# Patient Record
Sex: MALE | Race: WHITE | NOT HISPANIC OR LATINO | Employment: FULL TIME | URBAN - METROPOLITAN AREA
[De-identification: names, ages, dates, MRNs, and addresses within clinical notes are randomized per-mention and may not be internally consistent; named-entity substitution may affect disease eponyms.]

---

## 2017-01-11 ENCOUNTER — ALLSCRIPTS OFFICE VISIT (OUTPATIENT)
Dept: OTHER | Facility: OTHER | Age: 67
End: 2017-01-11

## 2017-03-09 ENCOUNTER — ALLSCRIPTS OFFICE VISIT (OUTPATIENT)
Dept: OTHER | Facility: OTHER | Age: 67
End: 2017-03-09

## 2017-03-09 DIAGNOSIS — E04.1 NONTOXIC SINGLE THYROID NODULE: ICD-10-CM

## 2017-03-09 DIAGNOSIS — Z12.5 ENCOUNTER FOR SCREENING FOR MALIGNANT NEOPLASM OF PROSTATE: ICD-10-CM

## 2017-03-09 DIAGNOSIS — I10 ESSENTIAL (PRIMARY) HYPERTENSION: ICD-10-CM

## 2017-03-13 ENCOUNTER — LAB (OUTPATIENT)
Dept: LAB | Facility: CLINIC | Age: 67
End: 2017-03-13
Payer: COMMERCIAL

## 2017-03-13 DIAGNOSIS — E04.1 NONTOXIC SINGLE THYROID NODULE: ICD-10-CM

## 2017-03-13 DIAGNOSIS — Z12.5 ENCOUNTER FOR SCREENING FOR MALIGNANT NEOPLASM OF PROSTATE: ICD-10-CM

## 2017-03-13 DIAGNOSIS — I10 ESSENTIAL (PRIMARY) HYPERTENSION: ICD-10-CM

## 2017-03-13 LAB
ALBUMIN SERPL BCP-MCNC: 3.6 G/DL (ref 3.5–5)
ALP SERPL-CCNC: 64 U/L (ref 46–116)
ALT SERPL W P-5'-P-CCNC: 26 U/L (ref 12–78)
ANION GAP SERPL CALCULATED.3IONS-SCNC: 7 MMOL/L (ref 4–13)
AST SERPL W P-5'-P-CCNC: 10 U/L (ref 5–45)
BASOPHILS # BLD AUTO: 0.02 THOUSANDS/ΜL (ref 0–0.1)
BASOPHILS NFR BLD AUTO: 0 % (ref 0–1)
BILIRUB SERPL-MCNC: 0.88 MG/DL (ref 0.2–1)
BUN SERPL-MCNC: 17 MG/DL (ref 5–25)
CALCIUM SERPL-MCNC: 8.5 MG/DL (ref 8.3–10.1)
CHLORIDE SERPL-SCNC: 105 MMOL/L (ref 100–108)
CHOLEST SERPL-MCNC: 169 MG/DL (ref 50–200)
CO2 SERPL-SCNC: 28 MMOL/L (ref 21–32)
CREAT SERPL-MCNC: 0.92 MG/DL (ref 0.6–1.3)
EOSINOPHIL # BLD AUTO: 0.16 THOUSAND/ΜL (ref 0–0.61)
EOSINOPHIL NFR BLD AUTO: 3 % (ref 0–6)
ERYTHROCYTE [DISTWIDTH] IN BLOOD BY AUTOMATED COUNT: 13 % (ref 11.6–15.1)
GFR SERPL CREATININE-BSD FRML MDRD: >60 ML/MIN/1.73SQ M
GLUCOSE SERPL-MCNC: 96 MG/DL (ref 65–140)
HCT VFR BLD AUTO: 42.8 % (ref 36.5–49.3)
HDLC SERPL-MCNC: 83 MG/DL (ref 40–60)
HGB BLD-MCNC: 14.7 G/DL (ref 12–17)
LDLC SERPL CALC-MCNC: 79 MG/DL (ref 0–100)
LYMPHOCYTES # BLD AUTO: 1.43 THOUSANDS/ΜL (ref 0.6–4.47)
LYMPHOCYTES NFR BLD AUTO: 28 % (ref 14–44)
MCH RBC QN AUTO: 29.7 PG (ref 26.8–34.3)
MCHC RBC AUTO-ENTMCNC: 34.3 G/DL (ref 31.4–37.4)
MCV RBC AUTO: 87 FL (ref 82–98)
MONOCYTES # BLD AUTO: 0.51 THOUSAND/ΜL (ref 0.17–1.22)
MONOCYTES NFR BLD AUTO: 10 % (ref 4–12)
NEUTROPHILS # BLD AUTO: 2.96 THOUSANDS/ΜL (ref 1.85–7.62)
NEUTS SEG NFR BLD AUTO: 59 % (ref 43–75)
NRBC BLD AUTO-RTO: 0 /100 WBCS
PLATELET # BLD AUTO: 218 THOUSANDS/UL (ref 149–390)
PMV BLD AUTO: 11.4 FL (ref 8.9–12.7)
POTASSIUM SERPL-SCNC: 3.8 MMOL/L (ref 3.5–5.3)
PROT SERPL-MCNC: 6.7 G/DL (ref 6.4–8.2)
PSA SERPL-MCNC: 2.2 NG/ML (ref 0–4)
RBC # BLD AUTO: 4.95 MILLION/UL (ref 3.88–5.62)
SODIUM SERPL-SCNC: 140 MMOL/L (ref 136–145)
TRIGL SERPL-MCNC: 34 MG/DL
TSH SERPL DL<=0.05 MIU/L-ACNC: 2.65 UIU/ML (ref 0.36–3.74)
WBC # BLD AUTO: 5.09 THOUSAND/UL (ref 4.31–10.16)

## 2017-03-13 PROCEDURE — 80053 COMPREHEN METABOLIC PANEL: CPT

## 2017-03-13 PROCEDURE — 36415 COLL VENOUS BLD VENIPUNCTURE: CPT

## 2017-03-13 PROCEDURE — 80061 LIPID PANEL: CPT

## 2017-03-13 PROCEDURE — 85025 COMPLETE CBC W/AUTO DIFF WBC: CPT

## 2017-03-13 PROCEDURE — G0103 PSA SCREENING: HCPCS

## 2017-03-13 PROCEDURE — 84443 ASSAY THYROID STIM HORMONE: CPT

## 2017-03-14 ENCOUNTER — GENERIC CONVERSION - ENCOUNTER (OUTPATIENT)
Dept: OTHER | Facility: OTHER | Age: 67
End: 2017-03-14

## 2017-03-16 ENCOUNTER — GENERIC CONVERSION - ENCOUNTER (OUTPATIENT)
Dept: OTHER | Facility: OTHER | Age: 67
End: 2017-03-16

## 2018-01-11 NOTE — PROGRESS NOTES
Assessment    1  Encounter for preventive health examination (V70 0) (Z00 00)   2  Thyroid nodule (241 0) (E04 1)    Plan  Hypertension    · Lisinopril 10 MG Oral Tablet; take 1 tablet by mouth every day  Need for Tdap vaccination    · Adacel 5-2-15 5 LF-MCG/0 5 Intramuscular Suspension  Thyroid nodule    · US THYROID; Status:Hold For - Scheduling; Requested for:09Mar2017;     Discussion/Summary  Impression: health maintenance visit  Currently, he eats a healthy diet and has an adequate exercise regimen  Prostate cancer screening: PSA was ordered  Colorectal cancer screening: colorectal cancer screening is current  The risks and benefits of immunizations were discussed, immunizations will be given as outlined in the orders and patient declines immunizations  Advice and education were given regarding nutrition, aerobic exercise and cardiovascular risk reduction  Continue with current medications  monitor BPs at home  follow up visit with dermatology  repeat labs  he is scheduled for a colonoscopy  Adacel  I discussed Prevnar 13 he deferred until he goes on Medicare  recheck thyroid u/s  History of Present Illness  HM, Adult Male: The patient is being seen for a health maintenance evaluation  The last health maintenance visit was 1 year(s) ago  Social History: Household members include spouse  He is   Work status: working full time and occupation: human resources  The patient has never smoked cigarettes  He reports occasional alcohol use  General Health: The patient's health since the last visit is described as good  He has regular dental visits  He denies vision problems  Vision care includes wearing glasses and an eye examination within the last year  He denies hearing loss  Lifestyle:  He consumes a diverse and healthy diet  He does not have any weight concerns  He exercises regularly  Exercise includes walking     Screening: Prostate cancer screening includes last prostate-specific antigen testing 02/2015  Colorectal cancer screening includes last colonoscopy done 03/2012  Metabolic screening includes lipid profile performed 02/2016, glucose screening performed 02/2016 and thyroid function test performed 04/2014  Cardiovascular risk factors: hypertension  HPI: 76year old male here for wellness exam  active problems and PMH see chart  medications reviewed  labs 02/2016 see note  FBS 81  lipid profile cholesterol 187  TGs 54  HDL 85  LDL 91    hypertension stable on current medication Lisinopril 10 mg daily  02/2015 creatinine 1 02  vascular screening 10/2015 normal        Review of Systems    Constitutional: no recent weight gain and no recent weight loss  Eyes: no eyesight problems  Cardiovascular: no chest pain, no palpitations and no extremity edema  Respiratory: no cough, no orthopnea, no wheezing, no shortness of breath during exertion and no PND  Gastrointestinal: no abdominal pain, no nausea, no vomiting, no constipation, no diarrhea and no blood in stools  Genitourinary: no dysuria, no urinary hesitancy, no incontinence and no nocturia  Musculoskeletal: no myalgias  Integumentary: skin lesion and past history of BCC, dysplastic nevus  he is seen by dermatology yearly  , but no rashes  Neurological: no headache and no dizziness  Psychiatric: no anxiety and no depression  Endocrine: 04/2013 7 mm thyroid nodule  no hoarseness or dysphagia  Active Problems    1   Hypertension (401 9) (I10)    Past Medical History    · History of Diverticulosis (562 10) (K57 90)   · History of Dysplastic Nevus (238 2)   · History of Exposure To Chemical Contaminants - Dioxin   · History of basal cell carcinoma (V10 83) (L05 595)   · History of hemorrhoids (V13 89) (Z87 19)   · History of Inguinal hernia (550 90) (K40 90)   · History of Nephrolithiasis (V13 01)   · History of Screening for genitourinary condition (V81 6) (Z13 89)   · History of Screening for neurological condition (V80 09) (Z13 89)    Surgical History    · History of Excision Of Lesion Face Malignant   · History of Excision Of Lesion Trunk Malignant   · History of Inguinal Hernia Repair    Family History  Mother    · Family history of Macular Degeneration  Father    · Family history of Aneurysm Of Abdominal Aorta   · Family history of Coronary Artery Disease (V17 49)   · Family history of Type 2 Diabetes Mellitus    Social History    · Never A Smoker    Current Meds   1  Aspirin 81 MG TABS; TAKE 1 TABLET DAILY; Therapy: (Recorded:03Mar2014) to Recorded   2  Lisinopril 10 MG Oral Tablet; take 1 tablet by mouth every day; Therapy: 58YJG1739 to (Evaluate:30Jun2017)  Requested for: 76JYQ4912; Last   Rx:02Mar2017 Ordered    Allergies    1  No Known Drug Allergies    Vitals   Recorded: 76KEL2484 08:55AM Recorded: 06LGY7753 08:05AM   Temperature  96 6 F   Heart Rate  68   Respiration  16   Systolic 774, LUE, Sitting 573   Diastolic 80, LUE, Sitting 82   BP CUFF SIZE Large    Height  5 ft 10 5 in   Weight  171 lb 6 4 oz   BMI Calculated  24 25   BSA Calculated  1 96     Physical Exam    Constitutional   General appearance: No acute distress, well appearing and well nourished  Eyes   Conjunctiva and lids: No erythema, swelling or discharge  Pupils and irises: Equal, round, reactive to light  Ophthalmoscopic examination: Normal fundi and optic discs  Ears, Nose, Mouth, and Throat   Otoscopic examination: Tympanic membranes translucent with normal light reflex  Canals patent without erythema  Oropharynx: Normal with no erythema, edema, exudate or lesions  Neck   Neck: Supple, symmetric, trachea midline, no masses  Thyroid: Normal, no thyromegaly  There were no thyroid nodules  Pulmonary   Auscultation of lungs: Clear to auscultation  Cardiovascular   Auscultation of heart: Normal rate and rhythm, normal S1 and S2, no murmurs  Heart sounds: no gallop heard  Carotid pulses: 2+ bilaterally    no bruit heard over the right carotid and no bruit heard over the left carotid  Abdominal aorta: Normal   Abdominal aorta: no bruit heard  Examination of extremities for edema and/or varicosities: Normal     Abdomen   Abdomen: Non-tender, no masses  Liver and spleen: No hepatomegaly or splenomegaly  Lymphatic   Palpation of lymph nodes in neck: No lymphadenopathy  Musculoskeletal   Gait and station: Normal     Skin   Skin and subcutaneous tissue: Normal without rashes or lesions  Psychiatric   Recent and remote memory: Intact  Mood and affect: Normal        Results/Data  PHQ-2 Adult Depression Screening 83LTV0740 08:09AM User, s     Test Name Result Flag Reference   PHQ-2 Adult Depression Score 0     Over the last two weeks, how often have you been bothered by any of the following problems? Little interest or pleasure in doing things: Not at all - 0  Feeling down, depressed, or hopeless: Not at all - 0   PHQ-2 Adult Depression Screening Negative       (1) LIPID PANEL, FASTING [id number] 05:07PM EPIC, Provider   Test ordered by: Rosie Mullen   Triglyceride:         Normal              <150 mg/dl       Borderline High    150-199 mg/dl       High               200-499 mg/dl       Very High          >499 mg/dl  Cholesterol:         Desirable        <200 mg/dl      Borderline High  200-239 mg/dl      High             >239 mg/dl  HDL Cholesterol:        High    >59 mg/dL      Low     <41 mg/dL  LDL CALCULATED:    This screening LDL is a calculated result  It does not have the accuracy of the Direct Measured LDL in the monitoring of patients with hyperlipidemia and/or statin therapy  Direct Measure LDL (UEP456) must be ordered separately in these patients       Test Name Result Flag Reference   CHOLESTEROL 187 mg/dL     HDL,DIRECT 85 mg/dL H 40-60   LDL CHOLESTEROL CALCULATED 91 mg/dL  0-100   TRIGLYCERIDES 54 mg/dL  <=150     (1) GLUCOSE,  FASTING [id number] 05:07PM EPIC, Provider   Test ordered by: Lorin Rivera     Test Name Result Flag Reference   GLUCOSE FASTING 81 mg/dL  65-99     (1) BASIC METABOLIC PROFILE 85LXM1835 07:26AM Aziza Holbrook     Test Name Result Flag Reference   SODIUM 136 mmol/L  136-145   POTASSIUM 4 5 mmol/L  3 5-5 3   CHLORIDE 99 mmol/L L 100-108   CARBON DIOXIDE 30 mmol/L  23-33   ANION GAP (CALC) 7 mmol/L  4-13   GLUCOSE,RANDM 103 mg/dL     If patient is fasting, the ADA then defines impaired fasting glucose as  >100 mg/dl and diabetes as  >or equal to 126 mg/dl  BLOOD UREA NITROGEN 14 mg/dL  5-25   CALCIUM 9 0 mg/dL  8 3-10 1   CREATININE 1 02 mg/dL  0 60-1 30   Standardized to IDMS reference method   eGFR African American >60 ml/min/1 73sqm     eGFR Non-African American >60 ml/min/1 73sqm       (1) LIPID PANEL, FASTING 36GJL5002 07:26AM Aziza Holbrook     Test Name Result Flag Reference   TRIGLYCERIDES 30 mg/dL     TRIGLYCERIDE:  Normal              <150 mg/dl  Borderline High    150-199 mg/dl  High               200-499 mg/dl  Very High          >499 mg/dl  _______________________________________   CHOLESTEROL 186 mg/dL     CHOLESTEROL:  Desirable        <200 mg/dl  Borderline High  200-239 mg/dl  High             >239 mg/dl  ____________________________________   HDL,DIRECT 85 mg/dL     HDL:  High       >59 mg/dl  Low        <41 mg/dl  ______________________________   LDL CHOLESTEROL CALCULATED 95 mg/dL  0-100   LDL, CALCULATED:  This screening LDL is a calculated result  It does not have the accuracy of the Direct Measured LDL in the  monitoring of patients with hyperlipidemia and/or statin therapy    Direct Measured LDL (Test Code 7783) must be ordered separately in these  patients   ______________________________     (1) PSA (SCREEN) (Dx V76 44 Screen for Prostate Cancer) 63HBY3040 07:26AM Aziza Holbrook     Test Name Result Flag Reference   PSA 1 6 ng/mL   0-4 0   PSA values from one laboratory may not be comparable to those from  another because of the various testing technologies employed  Additionally, PSA results can not be interpreted as absolute evidence of  the presence or absence of disease  This PSA value was obtained by [employer] Chemiluminometric Immunoassay  (1) TSH 60Hyv4267 10:26AM Rocío Patel     Test Name Result Flag Reference   TSH 2 844 uIU/ML  0 550-4 78   Test name changed to more accurately reflect methodology  Future Appointments    Date/Time Provider Specialty Site   03/12/2018 08:00 AM GEORGE Parker   [facility] [id number]     Signatures   Electronically signed by : GEORGE Crowley ; Mar  9 2017  9:00AM EST                       (Author)

## 2018-01-12 NOTE — PROGRESS NOTES
Assessment    1  Encounter for preventive health examination (V70 0) (Z00 00)    Plan  Screening for genitourinary condition    · *VB-Urinary Incontinence Screen (Dx V81 6 Screen for UI); Status:Complete -  Retrospective Authorization;   Done: 89OYX3576 12:00AM  Screening for neurological condition    · *VB - Fall Risk Assessment  (Dx V80 09 Screen for Neurologic Disorder);  Status:Complete - Retrospective By Protocol Authorization;   Done: 92ZDX1059 12:00AM  Screening for prostate cancer    · (1) PSA (SCREEN) (Dx V76 44 Screen for Prostate Cancer); Status:Active; Requested  for:07Mar2016;     Discussion/Summary  Impression: health maintenance visit  Currently, he eats a healthy diet and has an adequate exercise regimen  Prostate cancer screening: PSA was ordered  Colorectal cancer screening: colorectal cancer screening is current  The risks and benefits of immunizations were discussed and patient declines immunizations  Advice and education were given regarding nutrition, aerobic exercise and cardiovascular risk reduction  Continue with current medications  follow up visit with dermatology  History of Present Illness  HM, Adult Male: The patient is being seen for a health maintenance evaluation  The last health maintenance visit was 1 year(s) ago  Social History: Household members include spouse  He is   Work status: working full time and occupation: human resources  The patient has never smoked cigarettes  He reports occasional alcohol use  General Health: The patient's health since the last visit is described as good  He has regular dental visits  He denies vision problems  Vision care includes wearing glasses and an eye examination within the last year  He denies hearing loss  Lifestyle:  He consumes a diverse and healthy diet  He does not have any weight concerns  He exercises regularly  Exercise includes walking     Screening: Prostate cancer screening includes last prostate-specific antigen testing 02/2015  Colorectal cancer screening includes last colonoscopy done 03/2012  Metabolic screening includes lipid profile performed 02/2016, glucose screening performed 02/2016 and thyroid function test performed 04/2014  Cardiovascular risk factors: hypertension  HPI: 76year old male here for wellness exam  active problems and PMH see chart  medications reviewed  labs 02/2016 see note  FBS 81  lipid profile cholesterol 187  TGs 54  HDL 85  LDL 91    hypertension stable on current medication Lisinopril 10 mg daily  02/2015 creatinine 1 02  vascular screening 10/2015 normal        Review of Systems    Constitutional: no recent weight gain and no recent weight loss  Eyes: no eyesight problems  Cardiovascular: no chest pain, no palpitations and no extremity edema  Respiratory: no cough, no orthopnea, no wheezing, no shortness of breath during exertion and no PND  Gastrointestinal: no abdominal pain, no nausea, no vomiting, no constipation, no diarrhea and no blood in stools  Genitourinary: no dysuria, no urinary hesitancy, no incontinence and no nocturia  Musculoskeletal: no myalgias  Integumentary: skin lesion and past history of BCC, dysplastic nevus  he is seen by dermatology yearly  , but no rashes  Neurological: no headache and no dizziness  Psychiatric: no anxiety and no depression  Endocrine: 04/2013 7 mm thyroid nodule  Active Problems    1  Hypertension (401 9) (I10)   2  Need for prophylactic vaccination against Streptococcus pneumoniae (pneumococcus)   (V03 82) (Z23)   3  Need for prophylactic vaccination and inoculation against influenza (V04 81) (Z23)   4  Nontoxic single thyroid nodule (241 0) (E04 1)   5   Screening for prostate cancer (V76 44) (Z12 5)    Past Medical History    · History of Diverticulosis (562 10) (K57 90)   · History of Dysplastic Nevus (238 2)   · History of Exposure To Chemical Contaminants - Dioxin   · History of basal cell carcinoma (V10 83) (Z85 828)   · History of hemorrhoids (V13 89) (Z87 19)   · History of Inguinal hernia (550 90) (K40 90)   · History of Nephrolithiasis (V13 01)    Surgical History    · History of Excision Of Lesion Face Malignant   · History of Excision Of Lesion Trunk Malignant   · History of Inguinal Hernia Repair    Family History    · Family history of Macular Degeneration    · Family history of Aneurysm Of Abdominal Aorta   · Family history of Coronary Artery Disease (V17 49)   · Family history of Type 2 Diabetes Mellitus    Social History    · Never A Smoker    Current Meds   1  Aspirin 81 MG Oral Tablet; TAKE 1 TABLET DAILY; Therapy: (Recorded:03Mar2014) to Recorded   2  Lisinopril 10 MG Oral Tablet; take 1 tablet by mouth every day; Therapy: 95DFW7734 to (Evaluate:00Mur5690)  Requested for: 82Mhe4867; Last   Rx:10Vln0285 Ordered    Allergies    1  No Known Drug Allergies    Vitals   Recorded: 53JPE1718 07:59AM   Temperature 97 4 F   Heart Rate 76   Respiration 16   Systolic 141   Diastolic 78   Height 5 ft 10 5 in   Weight 170 lb    BMI Calculated 24 05   BSA Calculated 1 96     Physical Exam    Constitutional   General appearance: No acute distress, well appearing and well nourished  Eyes   Conjunctiva and lids: No erythema, swelling or discharge  Pupils and irises: Equal, round, reactive to light  Ophthalmoscopic examination: Normal fundi and optic discs  Ears, Nose, Mouth, and Throat   Otoscopic examination: Tympanic membranes translucent with normal light reflex  Canals patent without erythema  Oropharynx: Normal with no erythema, edema, exudate or lesions  Neck   Neck: Supple, symmetric, trachea midline, no masses  Thyroid: Normal, no thyromegaly  Pulmonary   Auscultation of lungs: Clear to auscultation  Cardiovascular   Auscultation of heart: Normal rate and rhythm, normal S1 and S2, no murmurs  Heart sounds: no gallop heard  Carotid pulses: 2+ bilaterally    no bruit heard over the right carotid and no bruit heard over the left carotid  Abdominal aorta: Normal   Abdominal aorta: no bruit heard  Examination of extremities for edema and/or varicosities: Normal     Abdomen   Abdomen: Non-tender, no masses  Liver and spleen: No hepatomegaly or splenomegaly  Lymphatic   Palpation of lymph nodes in neck: No lymphadenopathy  Musculoskeletal   Gait and station: Normal     Range of motion: Normal     Skin   Skin and subcutaneous tissue: Normal without rashes or lesions  Psychiatric   Mood and affect: Normal        Results/Data  *VB - Fall Risk Assessment  (Dx V80 09 Screen for Neurologic Disorder) 46HNA4939 12:00AM Jeet Fling     Test Name Result Flag Reference   Fall Risk Assessment [id number]       *VB-Urinary Incontinence Screen (Dx V81 6 Screen for UI) 45JAF9645 12:00AM Jeet Fling     Test Name Result Flag Reference   Urinary Incontinence Assessment [id number]       (1) LIPID PANEL, FASTING [id number] 05:07PM EPIC, Provider   Test ordered by: Nu Hooper   Triglyceride:         Normal              <150 mg/dl       Borderline High    150-199 mg/dl       High               200-499 mg/dl       Very High          >499 mg/dl  Cholesterol:         Desirable        <200 mg/dl      Borderline High  200-239 mg/dl      High             >239 mg/dl  HDL Cholesterol:        High    >59 mg/dL      Low     <41 mg/dL  LDL CALCULATED:    This screening LDL is a calculated result  It does not have the accuracy of the Direct Measured LDL in the monitoring of patients with hyperlipidemia and/or statin therapy  Direct Measure LDL (INN088) must be ordered separately in these patients       Test Name Result Flag Reference   CHOLESTEROL 187 mg/dL     HDL,DIRECT 85 mg/dL H 40-60   LDL CHOLESTEROL CALCULATED 91 mg/dL  0-100   TRIGLYCERIDES 54 mg/dL  <=150     (1) GLUCOSE,  FASTING [id number] 05:07PM EPIC, Provider   Test ordered by: Nu Hooper     Test Name Result Flag Reference GLUCOSE FASTING 81 mg/dL  65-99     (1) BASIC METABOLIC PROFILE [id number] 07:26AM Álvaro Oneal     Test Name Result Flag Reference   SODIUM 136 mmol/L  136-145   POTASSIUM 4 5 mmol/L  3 5-5 3   CHLORIDE 99 mmol/L L 100-108   CARBON DIOXIDE 30 mmol/L  23-33   ANION GAP (CALC) 7 mmol/L  4-13   GLUCOSE,RANDM 103 mg/dL     If patient is fasting, the ADA then defines impaired fasting glucose as  >100 mg/dl and diabetes as  >or equal to 126 mg/dl  BLOOD UREA NITROGEN 14 mg/dL  5-25   CALCIUM 9 0 mg/dL  8 3-10 1   CREATININE 1 02 mg/dL  0 60-1 30   Standardized to IDMS reference method   eGFR African American >60 ml/min/1 73sqm     eGFR Non-African American >60 ml/min/1 73sqm       (1) PSA (SCREEN) (Dx V76 44 Screen for Prostate Cancer) 63ILW7995 07:26AM Álvaro Oneal     Test Name Result Flag Reference   PSA 1 6 ng/mL   0-4 0   PSA values from one laboratory may not be comparable to those from  another because of the various testing technologies employed  Additionally, PSA results can not be interpreted as absolute evidence of  the presence or absence of disease  This PSA value was obtained by [employer] Chemil[employer]ometric Immunoassay  (1) TSH [id number] 10:26AM Álvaro Oneal     Test Name Result Flag Reference   TSH 2 844 uIU/ML  0 550-4 78   Test name changed to more accurately reflect methodology         Future Appointments    Date/Time Provider Specialty Site   03/09/2017 08:00 AM GEORGE Jason MD, [place] [address]   Electronically signed by : DEWAYNE Gold MD; Mar  7 2016  8:48AM EST                       (Author)

## 2018-01-12 NOTE — RESULT NOTES
Message  Gilles Mars I have enclosed a copy of your recent labs  all results are normal       Results/Data     (1) CBC/PLT/DIFF   WBC COUNT: 5 09 Thousand/uL Reference Range 4 31-10 16  RBC COUNT: 4 95 Million/uL Reference Range 3 88-5 62  HEMOGLOBIN: 14 7 g/dL Reference Range 12 0-17 0  HEMATOCRIT: 42 8 % Reference Range 36 5-49 3  MCV: 87 fL Reference Range 82-98  MCH: 29 7 pg Reference Range 26 8-34 3  MCHC: 34 3 g/dL Reference Range 31 4-37 4  RDW: 13 0 % Reference Range 11 6-15 1  MPV: 11 4 fL Reference Range 8 9-12 7  PLATELET COUNT: 284 Thousands/uL Reference Range 149-390  nRBC AUTOMATED: 0 /100 WBCs  NEUTROPHILS RELATIVE PERCENT: 59 % Reference Range 43-75  LYMPHOCYTES RELATIVE PERCENT: 28 % Reference Range 14-44  MONOCYTES RELATIVE PERCENT: 10 % Reference Range 4-12  EOSINOPHILS RELATIVE PERCENT: 3 % Reference Range 0-6  BASOPHILS RELATIVE PERCENT: 0 % Reference Range 0-1  NEUTROPHILS ABSOLUTE COUNT: 2 96 Thousands/? ??L Reference Range 1 85-7 62  LYMPHOCYTES ABSOLUTE COUNT: 1 43 Thousands/? ??L Reference Range 0 60-4 47  MONOCYTES ABSOLUTE COUNT: 0 51 Thousand/? ??L Reference Range 0 17-1 22  EOSINOPHILS ABSOLUTE COUNT: 0 16 Thousand/? ??L Reference Range 0 00-0 61  BASOPHILS ABSOLUTE COUNT: 0 02 Thousands/? ??L Reference Range 0 00-0 10  (1) COMPREHENSIVE METABOLIC PANEL   SODIUM: 502 mmol/L Reference Range 136-145  POTASSIUM: 3 8 mmol/L Reference Range 3 5-5 3  CHLORIDE: 105 mmol/L Reference Range 100-108  CARBON DIOXIDE: 28 mmol/L Reference Range 21-32  ANION GAP (CALC): 7 mmol/L Reference Range 4-13  BLOOD UREA NITROGEN: 17 mg/dL Reference Range 5-25  CREATININE: 0 92 mg/dL Reference Range 0 60-1 30  GLUCOSE,RANDM: 96 mg/dL Reference Range   CALCIUM: 8 5 mg/dL Reference Range 8 3-10 1  AST(SGOT): 10 U/L Reference Range 5-45  ALT (SGPT): 26 U/L Reference Range 12-78  ALK PHOSPHATAS: 64 U/L Reference Range   TOTAL PROTEIN: 6 7 g/dL Reference Range 6 4-8 2  ALBUMIN: 3 6 g/dL Reference Range 3 5-5 0  BILI, TOTAL: 0 88 mg/dL Reference Range 0 20-1 00  eGFR Non-: >60 0 ml/min/1 73sq m  (1) PSA (SCREEN) (Dx V76 44 Screen for Prostate Cancer)   PROSTATE SPECIFIC ANTIGEN: 2 2 ng/mL Reference Range 0 0-4 0  (1) TSH   TSH: 2 650 uIU/mL Reference Range 0 358-3 740   (1) LIPID PANEL, FASTING   CHOLESTEROL: 169 mg/dL Reference Range   TRIGLYCERIDES: 34 mg/dL Reference Range <=150  HDL,DIRECT: 83 mg/dL Abnormal High Reference Range 40-60  LDL CHOLESTEROL CALCULATED: 79 mg/dL Reference Range 0-100    Signatures   Electronically signed by : GEORGE Florez ; Mar 14 2017  8:24AM EST                       (Author)

## 2018-01-13 VITALS
HEART RATE: 68 BPM | RESPIRATION RATE: 16 BRPM | DIASTOLIC BLOOD PRESSURE: 80 MMHG | TEMPERATURE: 96.6 F | SYSTOLIC BLOOD PRESSURE: 140 MMHG | HEIGHT: 71 IN | WEIGHT: 171.4 LBS | BODY MASS INDEX: 24 KG/M2

## 2018-01-14 VITALS
HEART RATE: 78 BPM | DIASTOLIC BLOOD PRESSURE: 84 MMHG | SYSTOLIC BLOOD PRESSURE: 144 MMHG | BODY MASS INDEX: 23.83 KG/M2 | HEIGHT: 71 IN | WEIGHT: 170.25 LBS | RESPIRATION RATE: 18 BRPM | TEMPERATURE: 97.4 F

## 2018-02-21 ENCOUNTER — OFFICE VISIT (OUTPATIENT)
Dept: FAMILY MEDICINE CLINIC | Facility: CLINIC | Age: 68
End: 2018-02-21
Payer: MEDICARE

## 2018-02-21 VITALS
WEIGHT: 156 LBS | SYSTOLIC BLOOD PRESSURE: 128 MMHG | DIASTOLIC BLOOD PRESSURE: 74 MMHG | BODY MASS INDEX: 21.84 KG/M2 | HEIGHT: 71 IN | RESPIRATION RATE: 16 BRPM | HEART RATE: 62 BPM | TEMPERATURE: 98.2 F

## 2018-02-21 DIAGNOSIS — M19.042 PRIMARY OSTEOARTHRITIS OF BOTH HANDS: ICD-10-CM

## 2018-02-21 DIAGNOSIS — M19.041 PRIMARY OSTEOARTHRITIS OF BOTH HANDS: ICD-10-CM

## 2018-02-21 DIAGNOSIS — I10 ESSENTIAL HYPERTENSION: ICD-10-CM

## 2018-02-21 DIAGNOSIS — E04.1 THYROID NODULE: ICD-10-CM

## 2018-02-21 DIAGNOSIS — Z00.00 MEDICARE ANNUAL WELLNESS VISIT, SUBSEQUENT: Primary | ICD-10-CM

## 2018-02-21 DIAGNOSIS — N40.1 BENIGN PROSTATIC HYPERPLASIA WITH NOCTURIA: ICD-10-CM

## 2018-02-21 DIAGNOSIS — R35.1 BENIGN PROSTATIC HYPERPLASIA WITH NOCTURIA: ICD-10-CM

## 2018-02-21 DIAGNOSIS — Z12.5 SCREENING FOR MALIGNANT NEOPLASM OF PROSTATE: ICD-10-CM

## 2018-02-21 PROCEDURE — G0402 INITIAL PREVENTIVE EXAM: HCPCS | Performed by: FAMILY MEDICINE

## 2018-02-21 PROCEDURE — 99214 OFFICE O/P EST MOD 30 MIN: CPT | Performed by: FAMILY MEDICINE

## 2018-02-21 RX ORDER — LISINOPRIL 10 MG/1
10 TABLET ORAL DAILY
Qty: 90 TABLET | Refills: 3 | Status: SHIPPED | OUTPATIENT
Start: 2018-02-21 | End: 2019-02-22 | Stop reason: SDUPTHER

## 2018-02-21 RX ORDER — LISINOPRIL 10 MG/1
1 TABLET ORAL DAILY
COMMUNITY
Start: 2016-02-23 | End: 2018-02-21 | Stop reason: SDUPTHER

## 2018-02-21 NOTE — PROGRESS NOTES
Assessment/Plan:         Diagnoses and all orders for this visit:    Benign prostatic hyperplasia with nocturia    Medicare annual wellness visit, subsequent    Essential hypertension  -     CBC and differential  -     Comprehensive metabolic panel  -     Lipid panel  -     Urinalysis with reflex to microscopic  -     lisinopril (ZESTRIL) 10 mg tablet; Take 1 tablet (10 mg total) by mouth daily for 90 days    Thyroid nodule  -     TSH, 3rd generation with T4 reflex  -     US thyroid; Future    Primary osteoarthritis of both hands    Screening for malignant neoplasm of prostate  -     PSA; Future    Other orders  -     aspirin 81 MG tablet; Take 1 tablet by mouth daily  -     Discontinue: lisinopril (ZESTRIL) 10 mg tablet; Take 1 tablet by mouth daily        Repeat labs  Follow up thyroid u/s  He is not interested in a flu vaccine today  Prn Extra Strength Tylenol and/or Aleve for OA hands  He is not interested in therapy for BPH  OV one year  Patient ID: Anna Haile is a 75 y o  male  Follow up visit  Medications reviewed  Last labs 03/2017  Hypertension blood pressures stable on Lisinopril 10 mg daily  Creatinine 0 93   electrolytes normal  Hemoglobin 14 7  Lipid profile cholesterol 169  Triglycerides 34  HDL 83  LDL 79, FBS 96           Social History     Social History    Marital status: /Civil Union     Spouse name: N/A    Number of children: N/A    Years of education: N/A     Social History Main Topics    Smoking status: Never Smoker    Smokeless tobacco: Never Used    Alcohol use Yes      Comment: rare    Drug use: No    Sexual activity: Not Asked     Other Topics Concern    None     Social History Narrative    None     Family History   Problem Relation Age of Onset    Macular degeneration Mother     Aneurysm Father     Coronary artery disease Father     Diabetes type II Father        Review of Systems   Constitutional: Negative for appetite change, chills, fever and unexpected weight change  HENT: Negative for hearing loss, sore throat, trouble swallowing and voice change  History of thyroid nodules  Thyroid ultrasound 03/2017   stable 7 mm rim calcified nodule within the posterior right mid gland  New hypo echogenic 6 mm nodule in the upper pole of right lobe   03/2017 TSH 2 650   Eyes: Negative for visual disturbance  Respiratory: Negative for cough, shortness of breath and wheezing  No orthopnea or PND   Cardiovascular: Negative for chest pain, palpitations and leg swelling  Gastrointestinal: Negative for abdominal pain, blood in stool, constipation, diarrhea, nausea and vomiting          03/2017 colonoscopy   Genitourinary: Negative for difficulty urinating and dysuria  Nocturia 1-2   03/2017 PSA 2 2   Musculoskeletal: Positive for arthralgias  Negative for myalgias  OA hands  Skin: Negative for rash  Allergic/Immunologic: Negative for environmental allergies  Neurological: Negative for dizziness, weakness and headaches  Hematological: Negative for adenopathy  Does not bruise/bleed easily  Psychiatric/Behavioral: Negative for dysphoric mood and sleep disturbance  /74 (BP Location: Left arm, Patient Position: Sitting, Cuff Size: Large)   Pulse 62   Temp 98 2 °F (36 8 °C)   Resp 16   Ht 5' 11" (1 803 m)   Wt 70 8 kg (156 lb)   BMI 21 76 kg/m²          Physical Exam   Constitutional: He is oriented to person, place, and time  He appears well-developed and well-nourished  No distress  HENT:   Right Ear: Tympanic membrane normal    Left Ear: Tympanic membrane normal    Mouth/Throat: Oropharynx is clear and moist    Eyes: Conjunctivae and EOM are normal  Pupils are equal, round, and reactive to light  No scleral icterus  Funduscopic exam normal   Neck: Normal range of motion  Neck supple  No JVD present  Carotid bruit is not present  No tracheal deviation present   No thyroid mass and no thyromegaly present  Cardiovascular: Normal rate, regular rhythm, normal heart sounds and intact distal pulses  Exam reveals no gallop  No murmur heard  Pulses:       Carotid pulses are 2+ on the right side, and 2+ on the left side  Pulmonary/Chest: Effort normal and breath sounds normal  No respiratory distress  He has no wheezes  He has no rales  Abdominal: Soft  Bowel sounds are normal  He exhibits no distension and no mass  There is no hepatosplenomegaly  There is no tenderness  There is no rebound and no guarding  Musculoskeletal: Normal range of motion  He exhibits no edema  Heberden's and Lars's nodes of both hands  No active synovitis  Lymphadenopathy:     He has no cervical adenopathy  Neurological: He is alert and oriented to person, place, and time  No cranial nerve deficit  Skin: Skin is warm and dry  No rash noted  Psychiatric: He has a normal mood and affect  His behavior is normal    Nursing note and vitals reviewed        Recent Results (from the past 9408 hour(s))   CBC and differential    Collection Time: 03/13/17  8:05 AM   Result Value Ref Range    WBC 5 09 4 31 - 10 16 Thousand/uL    RBC 4 95 3 88 - 5 62 Million/uL    Hemoglobin 14 7 12 0 - 17 0 g/dL    Hematocrit 42 8 36 5 - 49 3 %    MCV 87 82 - 98 fL    MCH 29 7 26 8 - 34 3 pg    MCHC 34 3 31 4 - 37 4 g/dL    RDW 13 0 11 6 - 15 1 %    MPV 11 4 8 9 - 12 7 fL    Platelets 971 484 - 787 Thousands/uL    nRBC 0 /100 WBCs    Neutrophils Relative 59 43 - 75 %    Lymphocytes Relative 28 14 - 44 %    Monocytes Relative 10 4 - 12 %    Eosinophils Relative 3 0 - 6 %    Basophils Relative 0 0 - 1 %    Neutrophils Absolute 2 96 1 85 - 7 62 Thousands/µL    Lymphocytes Absolute 1 43 0 60 - 4 47 Thousands/µL    Monocytes Absolute 0 51 0 17 - 1 22 Thousand/µL    Eosinophils Absolute 0 16 0 00 - 0 61 Thousand/µL    Basophils Absolute 0 02 0 00 - 0 10 Thousands/µL   Comprehensive metabolic panel    Collection Time: 03/13/17  8:05 AM   Result Value Ref Range    Sodium 140 136 - 145 mmol/L    Potassium 3 8 3 5 - 5 3 mmol/L    Chloride 105 100 - 108 mmol/L    CO2 28 21 - 32 mmol/L    Anion Gap 7 4 - 13 mmol/L    BUN 17 5 - 25 mg/dL    Creatinine 0 92 0 60 - 1 30 mg/dL    Glucose 96 65 - 140 mg/dL    Calcium 8 5 8 3 - 10 1 mg/dL    AST 10 5 - 45 U/L    ALT 26 12 - 78 U/L    Alkaline Phosphatase 64 46 - 116 U/L    Total Protein 6 7 6 4 - 8 2 g/dL    Albumin 3 6 3 5 - 5 0 g/dL    Total Bilirubin 0 88 0 20 - 1 00 mg/dL    eGFR >60 0 ml/min/1 73sq m   PSA    Collection Time: 03/13/17  8:05 AM   Result Value Ref Range    PSA 2 2 0 0 - 4 0 ng/mL   TSH, 3rd generation    Collection Time: 03/13/17  8:05 AM   Result Value Ref Range    TSH 3RD GENERATON 2 650 0 358 - 3 740 uIU/mL   Lipid panel    Collection Time: 03/13/17 12:16 PM   Result Value Ref Range    Cholesterol 169 50 - 200 mg/dL    Triglycerides 34 <=150 mg/dL    HDL, Direct 83 (H) 40 - 60 mg/dL    LDL Calculated 79 0 - 100 mg/dL     AWV Clinical     ISAR:   Previous hospitalizations?:  No       Once in a Lifetime Medicare Screening:   EKG performed?:  No    AAA screening performed? (if performed, please add date to Health Maintenance):  No       Medicare Screening Tests and Risk Assessment:   AAA Risk Assessment    Age over 72 (males only):  Yes Family history of AAA:  No   Osteoporosis Risk Assessment    None indicated:  Yes    HIV Risk Assessment    None indicated:  Yes        Drug and Alcohol Use:   Tobacco use    Cigarettes:  never smoker    Tobacco use duration    Tobacco Cessation Readiness    Alcohol use    Alcohol use:  rare use    Alcohol Treatment Readiness   Illicit Drug Use    Drug use:  never        Diet & Exercise:   Diet   What is your diet?:  No Added Salt, Low Saturated Fat   How many servings a day of the following:   Fruits and Vegetables:  3-4 Meat:  1-2   Whole Grains:  1 Simple Carbs:  1   Dairy:  1 Soda:  0   Coffee:  1 Tea:  3   Exercise    Do you currently exercise?:  yes   Times per week:  5     Type of exercise:  walking       Cognitive Impairment Screening:   Depression screening preformed:  Yes     PHQ-9 Depression scale score:  2   Cognitive Impairment Screening    Do you have difficulty learning or retaining new information?:  No Do you have difficulty handling new tasks?:  No   Do you have difficulty with reasoning?:  No Do you have difficulty with spatial ability and orientation?:  No   Do you have difficulty with language?:  No Do you have difficulty with behavior?:  No       Functional Ability/Level of Safety:   Hearing    Hearing Impairment Assessment    Hearing status:  No impairment   Current Activities    Status:  unlimited ADL's, unlimited driving   Help needed with the folllowing:    Using the phone:  No Transportation:  No   Shopping:  No Preparing Meals:  No    Doing Laundry:  No   Managing Medications:  No Managing Money:  No   ADL    Feeding:  Independant   Oral hygiene and Facial grooming:  Independant   Bathing:  Independant   Upper Body Dressing:  Independant   Lower Body Dressing:  Independant   Bed Mobility:  Independant   Fall Risk   Have you fallen in the last 12 months?:  No Are you unsteady on your feet?:  No    Are you taking any medications that may cause fatigue or dizziness?:  No    Do you rush to the bathroom potentially risking a fall?:  No   Injury History       Home Safety:   Home Safety Risk Factors       Advanced Directives:   Advanced Directives    Living Will:  No Durable POA for healthcare:  No   Advanced directive:  No    Patient's End of Life Decisions        Urinary Incontinence:       Glaucoma:           Provider Screening     Preventative Screening/Counseling:   Cardiovascular Screening/Counseling:   (Labs Q5 years, EKG optional one-time)   General:  Screening Not Indicated Counseling:  Healthy Diet, Healthy Weight          Diabetes Screening/Counseling:   (2 tests/year if Pre-Diabetes or 1 test/year if no Diabetes)     Due for: Labs:  Blood Glucose         Colorectal Cancer Screening/Counseling:   (FOBT Q1 yr; Flex Sig Q4 yrs or Q10 yrs after Screening Colonoscopy; Screening Colonoscpy Q2 yrs High Risk or Q10 yrs Low Risk; Barium Enema Q2 yrs High Risk or Q4 yrs Low Risk)   General:  Screening Current           Prostate Cancer Screening/Counseling:   (Annual)     Due for: Labs:  PSA         Breast Cancer Screening/Counseling:   (Baseline Age 28 - 43; Annual Age 36+)         Cervical Cancer Screening/Counseling:   (Annual for High Risk or Childbearing Age with Abnormal Pap in Last 3 yrs; Every 2 all others)         Osteoporosis Screening/Counseling:   (Every 2 Yrs if at risk or more if medically necessary)   General:  Screening Not Indicated           AAA Screening/Counseling:   (Once per Lifetime with risk factors)    Family History of AAA:  No Age over 72 (males only):  Yes    General:  Screening Not Indicated           Glaucoma Screening/Counseling:   (Annual)   General:  Screening Current          HIV Screening/Counseling:   (Voluntary; Once annually for high risk OR 3 times for Pregnancy at diagnosis of IUP; 3rd trimester; and at Labor         Hepatitis C Screening:             Immunizations:   Influenza (annual):  Patient Declines   Pneumococcal (Once in a Lifetime):  Lifetime Vaccine Completed   Zostavax (Medicare D Coverage, Pt >64 yo):  Zostavax Vaccine UTD   Tdap (Non-Medicare Wellness Visit required):   Tdap Vaccine UTD       Other Preventative Couseling (Non-Medicare Wellness Visit Required):   nutrition counseling performed, fall prevention education provided       Referrals (Non-Medicare Wellness Visit Required):       Medical Equipment/Suppliers:           Health Maintenance   Topic Date Due    DTaP,Tdap,and Td Vaccines (3 - Td) 08/21/2018    Fall Risk  02/21/2019    Depression Screening PHQ-9  02/21/2019    GLAUCOMA SCREENING 67+ YR  02/21/2019    COLONOSCOPY  02/21/2023    Hepatitis C Screening  Addressed    INFLUENZA VACCINE Addressed    PNEUMOCOCCAL POLYSACCHARIDE VACCINE AGE 72 AND OVER  Addressed

## 2018-03-14 ENCOUNTER — APPOINTMENT (OUTPATIENT)
Dept: LAB | Facility: CLINIC | Age: 68
End: 2018-03-14
Payer: MEDICARE

## 2018-03-14 LAB
ALBUMIN SERPL BCP-MCNC: 3.8 G/DL (ref 3.5–5)
ALP SERPL-CCNC: 67 U/L (ref 46–116)
ALT SERPL W P-5'-P-CCNC: 22 U/L (ref 12–78)
ANION GAP SERPL CALCULATED.3IONS-SCNC: 7 MMOL/L (ref 4–13)
AST SERPL W P-5'-P-CCNC: 14 U/L (ref 5–45)
BASOPHILS # BLD AUTO: 0.03 THOUSANDS/ΜL (ref 0–0.1)
BASOPHILS NFR BLD AUTO: 1 % (ref 0–1)
BILIRUB SERPL-MCNC: 1.17 MG/DL (ref 0.2–1)
BILIRUB UR QL STRIP: NEGATIVE
BUN SERPL-MCNC: 16 MG/DL (ref 5–25)
CALCIUM SERPL-MCNC: 8.5 MG/DL (ref 8.3–10.1)
CHLORIDE SERPL-SCNC: 106 MMOL/L (ref 100–108)
CHOLEST SERPL-MCNC: 164 MG/DL (ref 50–200)
CLARITY UR: CLEAR
CO2 SERPL-SCNC: 26 MMOL/L (ref 21–32)
COLOR UR: YELLOW
CREAT SERPL-MCNC: 0.88 MG/DL (ref 0.6–1.3)
EOSINOPHIL # BLD AUTO: 0.06 THOUSAND/ΜL (ref 0–0.61)
EOSINOPHIL NFR BLD AUTO: 1 % (ref 0–6)
ERYTHROCYTE [DISTWIDTH] IN BLOOD BY AUTOMATED COUNT: 12.8 % (ref 11.6–15.1)
GFR SERPL CREATININE-BSD FRML MDRD: 88 ML/MIN/1.73SQ M
GLUCOSE P FAST SERPL-MCNC: 92 MG/DL (ref 65–99)
GLUCOSE UR STRIP-MCNC: NEGATIVE MG/DL
HCT VFR BLD AUTO: 41.8 % (ref 36.5–49.3)
HDLC SERPL-MCNC: 76 MG/DL (ref 40–60)
HGB BLD-MCNC: 14.6 G/DL (ref 12–17)
HGB UR QL STRIP.AUTO: NEGATIVE
KETONES UR STRIP-MCNC: NEGATIVE MG/DL
LDLC SERPL CALC-MCNC: 80 MG/DL (ref 0–100)
LEUKOCYTE ESTERASE UR QL STRIP: NEGATIVE
LYMPHOCYTES # BLD AUTO: 1.39 THOUSANDS/ΜL (ref 0.6–4.47)
LYMPHOCYTES NFR BLD AUTO: 30 % (ref 14–44)
MCH RBC QN AUTO: 29.8 PG (ref 26.8–34.3)
MCHC RBC AUTO-ENTMCNC: 34.9 G/DL (ref 31.4–37.4)
MCV RBC AUTO: 85 FL (ref 82–98)
MONOCYTES # BLD AUTO: 0.47 THOUSAND/ΜL (ref 0.17–1.22)
MONOCYTES NFR BLD AUTO: 10 % (ref 4–12)
NEUTROPHILS # BLD AUTO: 2.62 THOUSANDS/ΜL (ref 1.85–7.62)
NEUTS SEG NFR BLD AUTO: 58 % (ref 43–75)
NITRITE UR QL STRIP: NEGATIVE
NRBC BLD AUTO-RTO: 0 /100 WBCS
PH UR STRIP.AUTO: 6.5 [PH] (ref 4.5–8)
PLATELET # BLD AUTO: 227 THOUSANDS/UL (ref 149–390)
PMV BLD AUTO: 11.2 FL (ref 8.9–12.7)
POTASSIUM SERPL-SCNC: 3.8 MMOL/L (ref 3.5–5.3)
PROT SERPL-MCNC: 6.8 G/DL (ref 6.4–8.2)
PROT UR STRIP-MCNC: NEGATIVE MG/DL
RBC # BLD AUTO: 4.9 MILLION/UL (ref 3.88–5.62)
SODIUM SERPL-SCNC: 139 MMOL/L (ref 136–145)
SP GR UR STRIP.AUTO: 1.01 (ref 1–1.03)
TRIGL SERPL-MCNC: 39 MG/DL
TSH SERPL DL<=0.05 MIU/L-ACNC: 3.05 UIU/ML (ref 0.36–3.74)
UROBILINOGEN UR QL STRIP.AUTO: 0.2 E.U./DL
WBC # BLD AUTO: 4.58 THOUSAND/UL (ref 4.31–10.16)

## 2018-03-14 PROCEDURE — 81003 URINALYSIS AUTO W/O SCOPE: CPT | Performed by: FAMILY MEDICINE

## 2018-03-14 PROCEDURE — 80053 COMPREHEN METABOLIC PANEL: CPT | Performed by: FAMILY MEDICINE

## 2018-03-14 PROCEDURE — 85025 COMPLETE CBC W/AUTO DIFF WBC: CPT | Performed by: FAMILY MEDICINE

## 2018-03-14 PROCEDURE — 84443 ASSAY THYROID STIM HORMONE: CPT | Performed by: FAMILY MEDICINE

## 2018-03-14 PROCEDURE — 80061 LIPID PANEL: CPT | Performed by: FAMILY MEDICINE

## 2018-03-14 PROCEDURE — 36415 COLL VENOUS BLD VENIPUNCTURE: CPT | Performed by: FAMILY MEDICINE

## 2018-03-20 ENCOUNTER — APPOINTMENT (OUTPATIENT)
Dept: LAB | Facility: CLINIC | Age: 68
End: 2018-03-20
Payer: MEDICARE

## 2018-03-20 DIAGNOSIS — Z12.5 SCREENING FOR MALIGNANT NEOPLASM OF PROSTATE: ICD-10-CM

## 2018-03-20 LAB — PSA SERPL-MCNC: 1.8 NG/ML (ref 0–4)

## 2018-03-20 PROCEDURE — G0103 PSA SCREENING: HCPCS

## 2018-03-20 PROCEDURE — 36415 COLL VENOUS BLD VENIPUNCTURE: CPT

## 2019-02-22 ENCOUNTER — OFFICE VISIT (OUTPATIENT)
Dept: FAMILY MEDICINE CLINIC | Facility: CLINIC | Age: 69
End: 2019-02-22
Payer: MEDICARE

## 2019-02-22 VITALS
BODY MASS INDEX: 21.81 KG/M2 | OXYGEN SATURATION: 84 % | HEIGHT: 72 IN | HEART RATE: 84 BPM | TEMPERATURE: 96.4 F | RESPIRATION RATE: 16 BRPM | DIASTOLIC BLOOD PRESSURE: 90 MMHG | WEIGHT: 161 LBS | SYSTOLIC BLOOD PRESSURE: 138 MMHG

## 2019-02-22 DIAGNOSIS — I10 ESSENTIAL HYPERTENSION: Primary | ICD-10-CM

## 2019-02-22 DIAGNOSIS — E04.1 THYROID NODULE: ICD-10-CM

## 2019-02-22 DIAGNOSIS — Z12.5 SCREENING FOR PROSTATE CANCER: ICD-10-CM

## 2019-02-22 DIAGNOSIS — Z00.00 MEDICARE ANNUAL WELLNESS VISIT, INITIAL: ICD-10-CM

## 2019-02-22 PROCEDURE — G0439 PPPS, SUBSEQ VISIT: HCPCS | Performed by: FAMILY MEDICINE

## 2019-02-22 RX ORDER — LISINOPRIL 10 MG/1
10 TABLET ORAL DAILY
Qty: 90 TABLET | Refills: 3 | Status: SHIPPED | OUTPATIENT
Start: 2019-02-22 | End: 2020-03-18 | Stop reason: SDUPTHER

## 2019-02-22 NOTE — PROGRESS NOTES
Assessment and Plan:    Problem List Items Addressed This Visit        Endocrine    Thyroid nodule    Relevant Orders    US thyroid    TSH, 3rd generation with Free T4 reflex       Cardiovascular and Mediastinum    Hypertension - Primary    Relevant Medications    lisinopril (ZESTRIL) 10 mg tablet    Other Relevant Orders    CBC and differential    Comprehensive metabolic panel    Lipid panel      Other Visit Diagnoses     Medicare annual wellness visit, initial        Screening for prostate cancer        Relevant Orders    PSA, Total Screen        Health Maintenance Due   Topic Date Due    Pneumococcal PPSV23/PCV13 65+ Years / High and Highest Risk (1 of 2 - PCV13) 04/24/2014    Medicare Annual Wellness Visit (AWV)  02/21/2019     Patient offered Prevnar 13 he declined  HPI:  Laurita Boothe is a 70 y o  male here for his Subsequent Wellness Visit      Patient Active Problem List   Diagnosis    Hypertension    Thyroid nodule     Past Medical History:   Diagnosis Date    Agent orange exposure     while serving in [address] cell carcinoma     Diverticulosis     Dysplastic nevus     Hemorrhoids     Inguinal hernia     last assessed 01/25/2013    Nephrolithiasis     resolved 03/03/2014     Past Surgical History:   Procedure Laterality Date    INGUINAL HERNIA REPAIR Right     MALIGNANT SKIN LESION EXCISION      face and trunk     Family History   Problem Relation Age of Onset    Macular degeneration Mother     Aneurysm Father     Coronary artery disease Father     Diabetes type II Father      Social History     Tobacco Use   Smoking Status Never Smoker   Smokeless Tobacco Never Used     Social History     Substance and Sexual Activity   Alcohol Use Yes    Comment: rare      Social History     Substance and Sexual Activity   Drug Use No       Current Outpatient Medications   Medication Sig Dispense Refill    aspirin 81 MG tablet Take 1 tablet by mouth daily      lisinopril (ZESTRIL) 10 mg tablet Take 1 tablet (10 mg total) by mouth daily for 90 days 90 tablet 3     No current facility-administered medications for this visit  No Known Allergies  Immunization History   Administered Date(s) Administered    INFLUENZA 10/12/2018    Influenza Split High Dose Preservative Free IM 10/12/2018    Tdap 03/09/2017    Zoster 12/22/2014       Patient Care Team:  Karissa Elaine MD as PCP - General  Madelyn Elizabeth MD (Dermatology)    Medicare Screening Tests and Risk Assessments:  Chadwick Henry is here for his Initial Wellness visit  Last Medicare Wellness visit information reviewed, patient interviewed and updates made to the record today  Health Risk Assessment:  Patient rates overall health as very good  Patient feels that their physical health rating is Same  Eyesight was rated as Same  Hearing was rated as Same  Patient feels that their emotional and mental health rating is Same  Pain experienced by patient in the last 7 days has been Some  Patient's pain rating has been 3/10  Patient states that he has experienced no weight loss or gain in last 6 months  Emotional/Mental Health:  Patient has been feeling nervous/anxious  PHQ-9 Depression Screening:    Frequency of the following problems over the past two weeks:      1  Little interest or pleasure in doing things: 0 - not at all  PHQ-2 Score: 0          Broken Bones/Falls: Fall Risk Assessment:    In the past year, patient has experienced: History of falling in past year     Number of falls: 1  Patient does not feel he is unsteady standing  Patient is not taking medication that can cause feelings of lightheadedness or tiredness  Patient often has no need to rush to the toilet  Bladder/Bowel:  Patient has not leaked urine accidently in the last six months  Patient reports no loss of bowel control  Immunizations:  Patient has had a flu vaccination within the last year  Patient has received a pneumonia shot    Patient has received a shingles shot   Patient has received tetanus/diphtheria shot  Home Safety:  Patient does not have trouble with stairs inside or outside of their home  Patient currently reports that there are no safety hazards present in home, working smoke alarms, working carbon monoxide detectors  Preventative Screenings:   prostate cancer screen performed, colon cancer screen completed, cholesterol screen completed, glaucoma eye exam completed, (Additional Comments: PSA 03/2018  Colonoscopy 03/2017  Lipid profile 03/2018  Glaucoma screening 11/2018  )    Nutrition:  Current diet: Regular and No Added Salt with servings of the following:    Medications:  Patient is currently taking over-the-counter supplements  List of OTC medications includes: ASA 81 mg daily bony  Patient is able to manage medications  Lifestyle Choices:  Patient reports no tobacco use  Patient has not smoked or used tobacco in the past   Patient drives a vehicle  Patient wears seat belt  Activities of Daily Living:  Can get out of bed by his or her self, able to dress self, able to make own meals, able to do own shopping, able to bathe self, can do own laundry/housekeeping, can manage own money, pay bills and track expenses    Previous Hospitalizations:  No hospitalization or ED visit in past 12 months        Advanced Directives:  Patient has decided on a power of   Patient has spoken to designated power of   Patient has not completed advanced directive          Preventative Screening/Counseling:      Cardiovascular:      General: Screening Not Indicated      Counseling: Healthy Diet and Healthy Weight          Diabetes:      Due for labs: Blood Glucose          Colorectal Cancer:      General: Screening Current          Prostate Cancer:      Due for labs: PSA          Osteoporosis:      General: Screening Not Indicated          AAA:      General: Screening Not Indicated          Glaucoma:      General: Screening Current HIV:      General: Screening Not Indicated          Hepatitis C:      General: Risks and Benefits Discussed        Advanced Directives:   Patient has living will for healthcare, has durable POA for healthcare, patient does not have an advanced directive       Immunizations:      Influenza: Influenza UTD This Year      Pneumococcal: Risks & Benefits Discussed and Patient Declines      Zostavax: Zostavax Vaccine UTD      TDAP: Tdap Vaccine UTD

## 2019-02-22 NOTE — PROGRESS NOTES
Assessment/Plan:         Diagnoses and all orders for this visit:    Essential hypertension  -     lisinopril (ZESTRIL) 10 mg tablet; Take 1 tablet (10 mg total) by mouth daily for 90 days  -     CBC and differential  -     Comprehensive metabolic panel  -     Lipid panel    Medicare annual wellness visit, initial    Thyroid nodule  -     US thyroid; Future  -     TSH, 3rd generation with Free T4 reflex    Screening for prostate cancer  -     PSA, Total Screen; Future        Repeat labs and thyroid u/s  He is not interested in a Prevnar 13  OV one year        Patient ID: Aleida Garcia is a 70 y o  male  Follow up visit  Medications reviewed  Last labs 03/2018  Hypertension blood pressures have stable on Lisinopril 10 mg daily  Creatinine 0 88   electrolytes normal  Hemoglobin 14 6  Lipid profile cholesterol 164  Triglycerides 39  HDL 76  LDL 80, FBS 92  The following portions of the patient's history were reviewed and updated as appropriate: allergies, current medications, past family history, past medical history, past social history, past surgical history and problem list     Review of Systems   Constitutional: Negative for appetite change, chills, fever and unexpected weight change  HENT: Negative for congestion, ear pain, hearing loss, rhinorrhea, sore throat, trouble swallowing and voice change  History of thyroid nodules  Thyroid ultrasound 03/2018 thyroid ultrasound compared to study 03/2017 stable hypoechoic nodule within upper pole of the right lobe 5 3 mm x 3 2 mm x 4 6 mm  stable calcified nodule within the mid right lobe 7 x 6 x 7 mm  03/2017 TSH 2 650  History of radiation for acne as a teenager  Eyes: Negative for visual disturbance  Respiratory: Negative for cough, shortness of breath and wheezing  No orthopnea or PND   Cardiovascular: Negative for chest pain, palpitations and leg swelling     Gastrointestinal: Negative for abdominal pain, blood in stool, constipation, diarrhea, nausea and vomiting          03/2017 colonoscopy   Genitourinary: Negative for difficulty urinating         03/2018 PSA 1 8   Musculoskeletal: Negative for arthralgias and myalgias  OA hands  Skin: Negative for rash  Allergic/Immunologic: Negative for environmental allergies  Neurological: Negative for dizziness and headaches  Hematological: Negative for adenopathy  Does not bruise/bleed easily  Psychiatric/Behavioral: Negative for dysphoric mood and sleep disturbance  Objective:      /90   Pulse 84   Temp (!) 96 4 °F (35 8 °C)   Resp 16   Ht 6' (1 829 m)   Wt 73 kg (161 lb)   SpO2 (!) 84%   BMI 21 84 kg/m²          Physical Exam   Constitutional: He is oriented to person, place, and time  He appears well-developed and well-nourished  No distress  HENT:   Right Ear: Tympanic membrane normal    Left Ear: Tympanic membrane normal    Mouth/Throat: Oropharynx is clear and moist    Eyes: Pupils are equal, round, and reactive to light  Conjunctivae and EOM are normal  No scleral icterus  Neck: No JVD present  Carotid bruit is not present  No tracheal deviation present  No thyroid mass and no thyromegaly present  Cardiovascular: Normal rate, regular rhythm and normal heart sounds  Exam reveals no gallop  No murmur heard  Pulses:       Carotid pulses are 2+ on the right side, and 2+ on the left side  Pulmonary/Chest: Effort normal and breath sounds normal  No respiratory distress  He has no wheezes  He has no rales  Abdominal: Soft  Bowel sounds are normal  He exhibits no distension and no mass  There is no hepatosplenomegaly  There is no tenderness  There is no rebound and no guarding  Musculoskeletal: He exhibits no edema  Lymphadenopathy:     He has no cervical adenopathy  Neurological: He is alert and oriented to person, place, and time  He has normal reflexes  No cranial nerve deficit  Skin: No rash noted     Psychiatric: He has a normal mood and affect  His behavior is normal    Nursing note and vitals reviewed            Recent Results (from the past 85643 hour(s))   CBC and differential    Collection Time: 03/14/18  8:36 AM   Result Value Ref Range    WBC 4 58 4 31 - 10 16 Thousand/uL    RBC 4 90 3 88 - 5 62 Million/uL    Hemoglobin 14 6 12 0 - 17 0 g/dL    Hematocrit 41 8 36 5 - 49 3 %    MCV 85 82 - 98 fL    MCH 29 8 26 8 - 34 3 pg    MCHC 34 9 31 4 - 37 4 g/dL    RDW 12 8 11 6 - 15 1 %    MPV 11 2 8 9 - 12 7 fL    Platelets 553 651 - 867 Thousands/uL    nRBC 0 /100 WBCs    Neutrophils Relative 58 43 - 75 %    Lymphocytes Relative 30 14 - 44 %    Monocytes Relative 10 4 - 12 %    Eosinophils Relative 1 0 - 6 %    Basophils Relative 1 0 - 1 %    Neutrophils Absolute 2 62 1 85 - 7 62 Thousands/µL    Lymphocytes Absolute 1 39 0 60 - 4 47 Thousands/µL    Monocytes Absolute 0 47 0 17 - 1 22 Thousand/µL    Eosinophils Absolute 0 06 0 00 - 0 61 Thousand/µL    Basophils Absolute 0 03 0 00 - 0 10 Thousands/µL   Comprehensive metabolic panel    Collection Time: 03/14/18  8:36 AM   Result Value Ref Range    Sodium 139 136 - 145 mmol/L    Potassium 3 8 3 5 - 5 3 mmol/L    Chloride 106 100 - 108 mmol/L    CO2 26 21 - 32 mmol/L    ANION GAP 7 4 - 13 mmol/L    BUN 16 5 - 25 mg/dL    Creatinine 0 88 0 60 - 1 30 mg/dL    Glucose, Fasting 92 65 - 99 mg/dL    Calcium 8 5 8 3 - 10 1 mg/dL    AST 14 5 - 45 U/L    ALT 22 12 - 78 U/L    Alkaline Phosphatase 67 46 - 116 U/L    Total Protein 6 8 6 4 - 8 2 g/dL    Albumin 3 8 3 5 - 5 0 g/dL    Total Bilirubin 1 17 (H) 0 20 - 1 00 mg/dL    eGFR 88 ml/min/1 73sq m   Lipid panel    Collection Time: 03/14/18  8:36 AM   Result Value Ref Range    Cholesterol 164 50 - 200 mg/dL    Triglycerides 39 <=150 mg/dL    HDL, Direct 76 (H) 40 - 60 mg/dL    LDL Calculated 80 0 - 100 mg/dL   Urinalysis with reflex to microscopic    Collection Time: 03/14/18  8:36 AM   Result Value Ref Range    Color, UA Yellow     Clarity, UA Clear Specific Gravity, UA 1 007 1 003 - 1 030    pH, UA 6 5 4 5 - 8 0    Leukocytes, UA Negative Negative    Nitrite, UA Negative Negative    Protein, UA Negative Negative mg/dl    Glucose, UA Negative Negative mg/dl    Ketones, UA Negative Negative mg/dl    Urobilinogen, UA 0 2 0 2, 1 0 E U /dl E U /dl    Bilirubin, UA Negative Negative    Blood, UA Negative Negative   TSH, 3rd generation with T4 reflex    Collection Time: 03/14/18  8:36 AM   Result Value Ref Range    TSH 3RD GENERATON 3 050 0 358 - 3 740 uIU/mL   PSA    Collection Time: 03/20/18  8:51 AM   Result Value Ref Range    PSA 1 8 0 0 - 4 0 ng/mL

## 2019-02-22 NOTE — PROGRESS NOTES
Assessment/Plan:    No problem-specific Assessment & Plan notes found for this encounter  {Assess/PlanSmartLinks:58037}                    Subjective:                                                                                                                                                                                                                                                                                                                                                                                                                                                                                                                    Patient ID: Frank Adhikari is a 70 y o  male  Follow up visit  Medications reviewed  Last labs 03/2018  Hypertension blood pressures stable on Lisinopril 10 mg daily  Creatinine 0 93   electrolytes normal  Hemoglobin 14 7  Lipid profile cholesterol 169  Triglycerides 34  HDL 83  LDL 79, FBS 96        {Common ambulatory SmartLinks:46546}    Review of Systems   Constitutional: Negative for activity change, appetite change, chills, fever and unexpected weight change  HENT: Negative for congestion, ear pain, hearing loss, postnasal drip, rhinorrhea, sinus pressure, sinus pain, sore throat, trouble swallowing and voice change  History of thyroid nodules  Thyroid ultrasound 03/2017   stable 7 mm rim calcified nodule within the posterior right mid gland  New hypo echogenic 6 mm nodule in the upper pole of right lobe   03/2017 TSH 2 650   Eyes: Negative for visual disturbance  Respiratory: Negative for cough, chest tightness, shortness of breath and wheezing  No orthopnea or PND   Cardiovascular: Negative for chest pain, palpitations and leg swelling     Gastrointestinal: Negative for abdominal pain, blood in stool, constipation, diarrhea, nausea and vomiting          03/2017 colonoscopy   Genitourinary: Negative for difficulty urinating, dysuria and urgency  Nocturia 1-2   03/2017 PSA 2 2   Musculoskeletal: Negative for arthralgias and myalgias  OA hands  Skin: Negative for rash  Allergic/Immunologic: Negative for environmental allergies  Neurological: Negative for dizziness, weakness, light-headedness and headaches  Hematological: Negative for adenopathy  Does not bruise/bleed easily  Psychiatric/Behavioral: Negative for dysphoric mood and sleep disturbance  Objective:      /90   Pulse 84   Temp (!) 96 4 °F (35 8 °C)   Resp 16   Ht 6' (1 829 m)   Wt 73 kg (161 lb)   SpO2 (!) 84%   BMI 21 84 kg/m²          Physical Exam   Constitutional: He is oriented to person, place, and time  He appears well-developed and well-nourished  No distress  HENT:   Right Ear: Tympanic membrane normal    Left Ear: Tympanic membrane normal    Mouth/Throat: Oropharynx is clear and moist    Eyes: Pupils are equal, round, and reactive to light  Conjunctivae and EOM are normal  No scleral icterus  Funduscopic exam normal   Neck: Normal range of motion  Neck supple  No JVD present  Carotid bruit is not present  No tracheal deviation present  No thyroid mass and no thyromegaly present  Cardiovascular: Normal rate, regular rhythm, normal heart sounds and intact distal pulses  Exam reveals no gallop  No murmur heard  Pulses:       Carotid pulses are 2+ on the right side, and 2+ on the left side  Pulmonary/Chest: Effort normal and breath sounds normal  No respiratory distress  He has no wheezes  He has no rales  Abdominal: Soft  Bowel sounds are normal  He exhibits no distension and no mass  There is no hepatosplenomegaly  There is no tenderness  There is no rebound and no guarding  Musculoskeletal: Normal range of motion  He exhibits no edema or deformity  Lymphadenopathy:     He has no cervical adenopathy  Neurological: He is alert and oriented to person, place, and time  He has normal reflexes   No cranial nerve deficit  Skin: Skin is warm and dry  No rash noted  Psychiatric: He has a normal mood and affect  His behavior is normal    Nursing note and vitals reviewed          Recent Results (from the past 53033 hour(s))   CBC and differential    Collection Time: 03/14/18  8:36 AM   Result Value Ref Range    WBC 4 58 4 31 - 10 16 Thousand/uL    RBC 4 90 3 88 - 5 62 Million/uL    Hemoglobin 14 6 12 0 - 17 0 g/dL    Hematocrit 41 8 36 5 - 49 3 %    MCV 85 82 - 98 fL    MCH 29 8 26 8 - 34 3 pg    MCHC 34 9 31 4 - 37 4 g/dL    RDW 12 8 11 6 - 15 1 %    MPV 11 2 8 9 - 12 7 fL    Platelets 822 936 - 105 Thousands/uL    nRBC 0 /100 WBCs    Neutrophils Relative 58 43 - 75 %    Lymphocytes Relative 30 14 - 44 %    Monocytes Relative 10 4 - 12 %    Eosinophils Relative 1 0 - 6 %    Basophils Relative 1 0 - 1 %    Neutrophils Absolute 2 62 1 85 - 7 62 Thousands/µL    Lymphocytes Absolute 1 39 0 60 - 4 47 Thousands/µL    Monocytes Absolute 0 47 0 17 - 1 22 Thousand/µL    Eosinophils Absolute 0 06 0 00 - 0 61 Thousand/µL    Basophils Absolute 0 03 0 00 - 0 10 Thousands/µL   Comprehensive metabolic panel    Collection Time: 03/14/18  8:36 AM   Result Value Ref Range    Sodium 139 136 - 145 mmol/L    Potassium 3 8 3 5 - 5 3 mmol/L    Chloride 106 100 - 108 mmol/L    CO2 26 21 - 32 mmol/L    ANION GAP 7 4 - 13 mmol/L    BUN 16 5 - 25 mg/dL    Creatinine 0 88 0 60 - 1 30 mg/dL    Glucose, Fasting 92 65 - 99 mg/dL    Calcium 8 5 8 3 - 10 1 mg/dL    AST 14 5 - 45 U/L    ALT 22 12 - 78 U/L    Alkaline Phosphatase 67 46 - 116 U/L    Total Protein 6 8 6 4 - 8 2 g/dL    Albumin 3 8 3 5 - 5 0 g/dL    Total Bilirubin 1 17 (H) 0 20 - 1 00 mg/dL    eGFR 88 ml/min/1 73sq m   Lipid panel    Collection Time: 03/14/18  8:36 AM   Result Value Ref Range    Cholesterol 164 50 - 200 mg/dL    Triglycerides 39 <=150 mg/dL    HDL, Direct 76 (H) 40 - 60 mg/dL    LDL Calculated 80 0 - 100 mg/dL   Urinalysis with reflex to microscopic    Collection Time: 03/14/18 8:36 AM   Result Value Ref Range    Color, UA Yellow     Clarity, UA Clear     Specific Gravity, UA 1 007 1 003 - 1 030    pH, UA 6 5 4 5 - 8 0    Leukocytes, UA Negative Negative    Nitrite, UA Negative Negative    Protein, UA Negative Negative mg/dl    Glucose, UA Negative Negative mg/dl    Ketones, UA Negative Negative mg/dl    Urobilinogen, UA 0 2 0 2, 1 0 E U /dl E U /dl    Bilirubin, UA Negative Negative    Blood, UA Negative Negative   TSH, 3rd generation with T4 reflex    Collection Time: 03/14/18  8:36 AM   Result Value Ref Range    TSH 3RD GENERATON 3 050 0 358 - 3 740 uIU/mL   PSA    Collection Time: 03/20/18  8:51 AM   Result Value Ref Range    PSA 1 8 0 0 - 4 0 ng/mL

## 2019-03-26 ENCOUNTER — APPOINTMENT (OUTPATIENT)
Dept: LAB | Facility: CLINIC | Age: 69
End: 2019-03-26
Payer: MEDICARE

## 2019-03-26 DIAGNOSIS — Z12.5 SCREENING FOR PROSTATE CANCER: ICD-10-CM

## 2019-03-26 LAB
ALBUMIN SERPL BCP-MCNC: 3.9 G/DL (ref 3.5–5)
ALP SERPL-CCNC: 59 U/L (ref 46–116)
ALT SERPL W P-5'-P-CCNC: 25 U/L (ref 12–78)
ANION GAP SERPL CALCULATED.3IONS-SCNC: 6 MMOL/L (ref 4–13)
AST SERPL W P-5'-P-CCNC: 16 U/L (ref 5–45)
BASOPHILS # BLD AUTO: 0.04 THOUSANDS/ΜL (ref 0–0.1)
BASOPHILS NFR BLD AUTO: 1 % (ref 0–1)
BILIRUB SERPL-MCNC: 1.25 MG/DL (ref 0.2–1)
BUN SERPL-MCNC: 17 MG/DL (ref 5–25)
CALCIUM SERPL-MCNC: 8.7 MG/DL (ref 8.3–10.1)
CHLORIDE SERPL-SCNC: 106 MMOL/L (ref 100–108)
CHOLEST SERPL-MCNC: 169 MG/DL (ref 50–200)
CO2 SERPL-SCNC: 27 MMOL/L (ref 21–32)
CREAT SERPL-MCNC: 1.03 MG/DL (ref 0.6–1.3)
EOSINOPHIL # BLD AUTO: 0.07 THOUSAND/ΜL (ref 0–0.61)
EOSINOPHIL NFR BLD AUTO: 2 % (ref 0–6)
ERYTHROCYTE [DISTWIDTH] IN BLOOD BY AUTOMATED COUNT: 12.7 % (ref 11.6–15.1)
GFR SERPL CREATININE-BSD FRML MDRD: 74 ML/MIN/1.73SQ M
GLUCOSE P FAST SERPL-MCNC: 88 MG/DL (ref 65–99)
HCT VFR BLD AUTO: 44.2 % (ref 36.5–49.3)
HDLC SERPL-MCNC: 77 MG/DL (ref 40–60)
HGB BLD-MCNC: 14.7 G/DL (ref 12–17)
IMM GRANULOCYTES # BLD AUTO: 0 THOUSAND/UL (ref 0–0.2)
IMM GRANULOCYTES NFR BLD AUTO: 0 % (ref 0–2)
LDLC SERPL CALC-MCNC: 84 MG/DL (ref 0–100)
LYMPHOCYTES # BLD AUTO: 1.52 THOUSANDS/ΜL (ref 0.6–4.47)
LYMPHOCYTES NFR BLD AUTO: 37 % (ref 14–44)
MCH RBC QN AUTO: 29.7 PG (ref 26.8–34.3)
MCHC RBC AUTO-ENTMCNC: 33.3 G/DL (ref 31.4–37.4)
MCV RBC AUTO: 89 FL (ref 82–98)
MONOCYTES # BLD AUTO: 0.51 THOUSAND/ΜL (ref 0.17–1.22)
MONOCYTES NFR BLD AUTO: 12 % (ref 4–12)
NEUTROPHILS # BLD AUTO: 1.97 THOUSANDS/ΜL (ref 1.85–7.62)
NEUTS SEG NFR BLD AUTO: 48 % (ref 43–75)
NONHDLC SERPL-MCNC: 92 MG/DL
NRBC BLD AUTO-RTO: 0 /100 WBCS
PLATELET # BLD AUTO: 200 THOUSANDS/UL (ref 149–390)
PMV BLD AUTO: 11.3 FL (ref 8.9–12.7)
POTASSIUM SERPL-SCNC: 3.8 MMOL/L (ref 3.5–5.3)
PROT SERPL-MCNC: 7 G/DL (ref 6.4–8.2)
PSA SERPL-MCNC: 1.7 NG/ML (ref 0–4)
RBC # BLD AUTO: 4.95 MILLION/UL (ref 3.88–5.62)
SODIUM SERPL-SCNC: 139 MMOL/L (ref 136–145)
TRIGL SERPL-MCNC: 38 MG/DL
TSH SERPL DL<=0.05 MIU/L-ACNC: 3.49 UIU/ML (ref 0.36–3.74)
WBC # BLD AUTO: 4.11 THOUSAND/UL (ref 4.31–10.16)

## 2019-03-26 PROCEDURE — 80053 COMPREHEN METABOLIC PANEL: CPT | Performed by: FAMILY MEDICINE

## 2019-03-26 PROCEDURE — 85025 COMPLETE CBC W/AUTO DIFF WBC: CPT | Performed by: FAMILY MEDICINE

## 2019-03-26 PROCEDURE — 80061 LIPID PANEL: CPT | Performed by: FAMILY MEDICINE

## 2019-03-26 PROCEDURE — G0103 PSA SCREENING: HCPCS

## 2019-03-26 PROCEDURE — 84443 ASSAY THYROID STIM HORMONE: CPT | Performed by: FAMILY MEDICINE

## 2019-03-26 PROCEDURE — 36415 COLL VENOUS BLD VENIPUNCTURE: CPT | Performed by: FAMILY MEDICINE

## 2019-03-26 NOTE — RESULT ENCOUNTER NOTE
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

## 2019-09-27 ENCOUNTER — TELEPHONE (OUTPATIENT)
Dept: FAMILY MEDICINE CLINIC | Facility: CLINIC | Age: 69
End: 2019-09-27

## 2019-09-27 NOTE — TELEPHONE ENCOUNTER
Call I reviewed his results  2 small right thyroid nodules no changes from 2018  No suspicious features  Consider repeat thyroid u/s in one year

## 2019-09-27 NOTE — TELEPHONE ENCOUNTER
Patient had Thyroid US done on 9/19/19  My Chart says his results are in but I don't see anything in his chart  He would like a call back with results

## 2020-01-22 ENCOUNTER — TELEPHONE (OUTPATIENT)
Dept: FAMILY MEDICINE CLINIC | Facility: CLINIC | Age: 70
End: 2020-01-22

## 2020-03-17 NOTE — PROGRESS NOTES
Assessment/Plan:     Diagnoses and all orders for this visit:    Medicare annual wellness visit, subsequent    Essential hypertension  -     CBC and differential  -     Comprehensive metabolic panel  -     Discontinue: lisinopril (ZESTRIL) 10 mg tablet; Take 1 tablet (10 mg total) by mouth daily  -     lisinopril (ZESTRIL) 10 mg tablet; Take 1 tablet (10 mg total) by mouth daily    Thyroid nodule  -     TSH, 3rd generation with Free T4 reflex  -     US thyroid; Future    Leukopenia, unspecified type    Screening for prostate cancer  -     PSA, Total Screen; Future          Repeat thyroid u/s and labs  Check on prior pneumococcal vaccines ( previous vaccines at [facility])  Monitor BP at home  OV 1 year  BMI Counseling: Body mass index is 21 56 kg/m²  The BMI is below normal  Dietary education for weight gain was provided to the patient today  Patient ID: Raphael Hobson is a 78 y o  male  Follow up visit  Medications reviewed  Last labs 03/2019 see note  Hypertension blood pressures have stable on Lisinopril 10 mg daily  Creatinine 1 03  Electrolytes normal  Hemoglobin 14 7  Lipid profile cholesterol 169  Triglycerides 38  HDL 77  LDL 84, FBS 88  The following portions of the patient's history were reviewed and updated as appropriate: allergies, current medications, past family history, past medical history, past social history, past surgical history and problem list     Review of Systems   Constitutional: Negative for appetite change, chills, fever and unexpected weight change  HENT: Negative for congestion, ear pain, hearing loss, rhinorrhea, sore throat, trouble swallowing and voice change  History of thyroid nodules  09/2019 thyroid u/s right upper lobe stable circumcised hypoechoic nodule measuring 5 x 3 x 4 mm  Stable rim calcified nodule in the posterior mid right gland 7 x 6 x 7 mm  Impression stable right thyroid nodules compared to last exam 03/2018    Thyroid ultrasound 03/2018 thyroid ultrasound compared to study 03/2017 stable hypoechoic nodule within upper pole of the right lobe 5 3 mm x 3 2 mm x 4 6 mm  stable calcified nodule within the mid right lobe 7 x 6 x 7 mm  03/2017 TSH 2 650  History of radiation for acne as a teenager  Eyes: Negative for visual disturbance  Respiratory: Negative for cough, shortness of breath and wheezing  Cardiovascular: Negative for chest pain, palpitations and leg swelling  Gastrointestinal: Negative for abdominal pain, blood in stool, constipation, diarrhea, nausea and vomiting          03/2017 colonoscopy   Endocrine: Negative for polydipsia and polyuria  See ENT ROS  03/2019 TSH 3 490  Genitourinary: Negative for difficulty urinating         03/2019 PSA 1 7   Musculoskeletal: Negative for arthralgias and myalgias  Skin: Negative for rash  Allergic/Immunologic: Negative for environmental allergies  Neurological: Negative for dizziness and headaches  Hematological: Negative for adenopathy  Does not bruise/bleed easily  History of mild leukopenia  03/2019 CBC WBC 4,100  Hgb 14 7  MCV 89  Platelets 224,906  WBC (Thousand/uL)       Date                     Value                 03/26/2019               4 11 (L)              03/14/2018               4 58                  03/13/2017               5 09                  04/10/2014               4 42                Psychiatric/Behavioral: Negative for dysphoric mood and sleep disturbance  Objective:      /86   Pulse 64   Temp (!) 97 4 °F (36 3 °C)   Resp 16   Ht 6' (1 829 m)   Wt 72 1 kg (159 lb)   BMI 21 56 kg/m²     Wt Readings from Last 3 Encounters:   03/18/20 72 1 kg (159 lb)   02/22/19 73 kg (161 lb)   02/21/18 70 8 kg (156 lb)       BP Readings from Last 3 Encounters:   03/18/20 144/86   02/22/19 138/90   02/21/18 128/74        Physical Exam   Constitutional: He is oriented to person, place, and time   He appears well-developed and well-nourished  No distress  HENT:   Right Ear: Tympanic membrane normal    Left Ear: Tympanic membrane normal    Mouth/Throat: Oropharynx is clear and moist    Eyes: Pupils are equal, round, and reactive to light  Conjunctivae and EOM are normal  No scleral icterus  Funduscopic exam normal   Neck: No JVD present  Carotid bruit is not present  No tracheal deviation present  No thyroid mass and no thyromegaly present  Cardiovascular: Normal rate, regular rhythm and normal heart sounds  Exam reveals no gallop  No murmur heard  Pulses:       Carotid pulses are 2+ on the right side, and 2+ on the left side  Pulmonary/Chest: Effort normal and breath sounds normal  No respiratory distress  He has no wheezes  He has no rales  Abdominal: Soft  Bowel sounds are normal  He exhibits no distension, no abdominal bruit and no mass  There is no hepatosplenomegaly  There is no tenderness  There is no rebound and no guarding  Musculoskeletal: Normal range of motion  He exhibits no edema  Lymphadenopathy:     He has no cervical adenopathy  Neurological: He is alert and oriented to person, place, and time  No cranial nerve deficit  Skin: No rash noted  No cyanosis  Nails show no clubbing  Psychiatric: He has a normal mood and affect  Nursing note and vitals reviewed          Lab Results   Component Value Date    WBC 4 11 (L) 03/26/2019    HGB 14 7 03/26/2019    HCT 44 2 03/26/2019    MCV 89 03/26/2019     03/26/2019       Lab Results   Component Value Date     02/25/2015    SODIUM 139 03/26/2019    K 3 8 03/26/2019     03/26/2019    CO2 27 03/26/2019    ANIONGAP 7 02/25/2015    AGAP 6 03/26/2019    BUN 17 03/26/2019    CREATININE 1 03 03/26/2019    GLUC 96 03/13/2017    GLUF 88 03/26/2019    CALCIUM 8 7 03/26/2019    AST 16 03/26/2019    ALT 25 03/26/2019    ALKPHOS 59 03/26/2019    PROT 6 9 04/10/2014    TP 7 0 03/26/2019    BILITOT 1 21 (H) 04/10/2014    TBILI 1 25 (H) 03/26/2019    EGFR 74 03/26/2019       Lab Results   Component Value Date    CHOLESTEROL 169 03/26/2019    CHOLESTEROL 164 03/14/2018    CHOLESTEROL 169 03/13/2017     Lab Results   Component Value Date    HDL 77 (H) 03/26/2019    HDL 76 (H) 03/14/2018    HDL 83 (H) 03/13/2017     Lab Results   Component Value Date    TRIG 38 03/26/2019    TRIG 39 03/14/2018    TRIG 34 03/13/2017     Lab Results   Component Value Date    NONHDLC 92 03/26/2019     Lab Results   Component Value Date    TUU2JBHJVVSM 3 490 03/26/2019       Lab Results   Component Value Date    PSA 1 7 03/26/2019    PSA 1 8 03/20/2018    PSA 2 2 03/13/2017

## 2020-03-18 ENCOUNTER — OFFICE VISIT (OUTPATIENT)
Dept: FAMILY MEDICINE CLINIC | Facility: CLINIC | Age: 70
End: 2020-03-18
Payer: MEDICARE

## 2020-03-18 VITALS
BODY MASS INDEX: 21.54 KG/M2 | DIASTOLIC BLOOD PRESSURE: 84 MMHG | SYSTOLIC BLOOD PRESSURE: 136 MMHG | HEIGHT: 72 IN | RESPIRATION RATE: 16 BRPM | WEIGHT: 159 LBS | TEMPERATURE: 97.4 F | HEART RATE: 64 BPM

## 2020-03-18 DIAGNOSIS — Z00.00 MEDICARE ANNUAL WELLNESS VISIT, SUBSEQUENT: Primary | ICD-10-CM

## 2020-03-18 DIAGNOSIS — D72.819 LEUKOPENIA, UNSPECIFIED TYPE: ICD-10-CM

## 2020-03-18 DIAGNOSIS — Z12.5 SCREENING FOR PROSTATE CANCER: ICD-10-CM

## 2020-03-18 DIAGNOSIS — I10 ESSENTIAL HYPERTENSION: ICD-10-CM

## 2020-03-18 DIAGNOSIS — E04.1 THYROID NODULE: ICD-10-CM

## 2020-03-18 PROCEDURE — 3079F DIAST BP 80-89 MM HG: CPT | Performed by: FAMILY MEDICINE

## 2020-03-18 PROCEDURE — 1170F FXNL STATUS ASSESSED: CPT | Performed by: FAMILY MEDICINE

## 2020-03-18 PROCEDURE — 3075F SYST BP GE 130 - 139MM HG: CPT | Performed by: FAMILY MEDICINE

## 2020-03-18 PROCEDURE — 1125F AMNT PAIN NOTED PAIN PRSNT: CPT | Performed by: FAMILY MEDICINE

## 2020-03-18 PROCEDURE — 1160F RVW MEDS BY RX/DR IN RCRD: CPT | Performed by: FAMILY MEDICINE

## 2020-03-18 PROCEDURE — 3008F BODY MASS INDEX DOCD: CPT | Performed by: FAMILY MEDICINE

## 2020-03-18 PROCEDURE — G0439 PPPS, SUBSEQ VISIT: HCPCS | Performed by: FAMILY MEDICINE

## 2020-03-18 PROCEDURE — 1036F TOBACCO NON-USER: CPT | Performed by: FAMILY MEDICINE

## 2020-03-18 RX ORDER — LISINOPRIL 10 MG/1
10 TABLET ORAL DAILY
Qty: 90 TABLET | Refills: 3 | Status: SHIPPED | OUTPATIENT
Start: 2020-03-18 | End: 2021-03-03

## 2020-03-18 RX ORDER — LISINOPRIL 10 MG/1
10 TABLET ORAL DAILY
Qty: 90 TABLET | Refills: 3 | Status: SHIPPED | OUTPATIENT
Start: 2020-03-18 | End: 2020-03-18 | Stop reason: SDUPTHER

## 2020-03-18 NOTE — PROGRESS NOTES
Assessment and Plan:     Problem List Items Addressed This Visit        Endocrine    Thyroid nodule    Relevant Orders    TSH, 3rd generation with Free T4 reflex    US thyroid       Cardiovascular and Mediastinum    Hypertension    Relevant Medications    lisinopril (ZESTRIL) 10 mg tablet    Other Relevant Orders    CBC and differential    Comprehensive metabolic panel       Other    Leukopenia      Other Visit Diagnoses     Medicare annual wellness visit, subsequent    -  Primary    Screening for prostate cancer        Relevant Orders    PSA, Total Screen           Preventive health issues were discussed with patient, and age appropriate screening tests were ordered as noted in patient's After Visit Summary  Personalized health advice and appropriate referrals for health education or preventive services given if needed, as noted in patient's After Visit Summary       History of Present Illness:     Patient presents for Medicare Annual Wellness visit    Patient Care Team:  Juan Crespo MD as PCP - General  Edmond Archer MD (Dermatology)     Problem List:     Patient Active Problem List   Diagnosis    Hypertension    Thyroid nodule    Leukopenia      Past Medical and Surgical History:     Past Medical History:   Diagnosis Date    Agent orange exposure     while serving in [employer] [facility] [place] cell carcinoma     Diverticulosis     Dysplastic nevus     Hemorrhoids     Inguinal hernia     last assessed 01/25/2013    Nephrolithiasis     resolved 03/03/2014     Past Surgical History:   Procedure Laterality Date    INGUINAL HERNIA REPAIR Right     MALIGNANT SKIN LESION EXCISION      face and trunk      Family History:     Family History   Problem Relation Age of Onset    Macular degeneration Mother     Aneurysm Father     Coronary artery disease Father     Diabetes type II Father       Social History:        Social History     Socioeconomic History    Marital status: /Civil Union     Spouse name: None    Number of children: None    Years of education: None    Highest education level: None   Occupational History    None   Social Needs    Financial resource strain: None    Food insecurity:     Worry: None     Inability: None    Transportation needs:     Medical: None     Non-medical: None   Tobacco Use    Smoking status: Never Smoker    Smokeless tobacco: Never Used   Substance and Sexual Activity    Alcohol use: Yes     Comment: rare    Drug use: No    Sexual activity: None   Lifestyle    Physical activity:     Days per week: None     Minutes per session: None    Stress: None   Relationships    Social connections:     Talks on phone: None     Gets together: None     Attends [employer] service: None     Active member of club or organization: None     Attends meetings of clubs or organizations: None     Relationship status: None    Intimate partner violence:     Fear of current or ex partner: None     Emotionally abused: None     Physically abused: None     Forced sexual activity: None   Other Topics Concern    None   Social History Narrative    None      Medications and Allergies:     Current Outpatient Medications   Medication Sig Dispense Refill    aspirin 81 MG tablet Take 1 tablet by mouth daily      lisinopril (ZESTRIL) 10 mg tablet Take 1 tablet (10 mg total) by mouth daily 90 tablet 3     No current facility-administered medications for this visit        No Known Allergies   Immunizations:     Immunization History   Administered Date(s) Administered    INFLUENZA 10/12/2018, 10/10/2019    Influenza Split High Dose Preservative Free IM 10/12/2018, 10/10/2019    Tdap 03/09/2017    Zoster 12/22/2014      Health Maintenance:         Topic Date Due    CRC Screening: Colonoscopy  02/21/2023    Hepatitis C Screening  Addressed         Topic Date Due    Pneumococcal Vaccine: 65+ Years (1 of 2 - PCV13) 04/24/2014      Medicare Health Risk Assessment:     /84 (BP Location: Left arm, Patient Position: Sitting, Cuff Size: Large)   Pulse 64   Temp (!) 97 4 °F (36 3 °C)   Resp 16   Ht 6' (1 829 m)   Wt 72 1 kg (159 lb)   BMI 21 56 kg/m²      Jewell Zamora is here for his Subsequent Wellness visit  Last Medicare Wellness visit information reviewed, patient interviewed and updates made to the record today  Health Risk Assessment:   Patient rates overall health as very good  Patient feels that their physical health rating is same  Eyesight was rated as same  Hearing was rated as same  Patient feels that their emotional and mental health rating is same  Pain experienced in the last 7 days has been none  Patient states that he has experienced no weight loss or gain in last 6 months  Depression Screening:   PHQ-2 Score: 0      Fall Risk Screening: In the past year, patient has experienced: no history of falling in past year      Home Safety:  Patient does not have trouble with stairs inside or outside of their home  Patient has working smoke alarms and has working carbon monoxide detector  Home safety hazards include: none  Nutrition:   Current diet is Regular and No Added Salt  Medications:   Patient is currently taking over-the-counter supplements  OTC medications include: see medication list  Patient is able to manage medications  Activities of Daily Living (ADLs)/Instrumental Activities of Daily Living (IADLs):   Walk and transfer into and out of bed and chair?: Yes  Dress and groom yourself?: Yes    Bathe or shower yourself?: Yes    Feed yourself?  Yes  Do your laundry/housekeeping?: Yes  Manage your money, pay your bills and track your expenses?: Yes  Make your own meals?: Yes    Do your own shopping?: Yes    Previous Hospitalizations:   Any hospitalizations or ED visits within the last 12 months?: No      Advance Care Planning:   Living will: Yes    Advanced directive: Yes      Cognitive Screening:   Provider or family/friend/caregiver concerned regarding cognition?: No    PREVENTIVE SCREENINGS      Cardiovascular Screening:    General: Screening Current      Diabetes Screening:     General: Screening Current      Colorectal Cancer Screening:     General: Screening Current      Prostate Cancer Screening:    General: Screening Current    Due for: PSA      Osteoporosis Screening:    General: Screening Not Indicated      Abdominal Aortic Aneurysm (AAA) Screening:    Risk factors include: age between 73-69 yo        General: Screening Not Indicated      Lung Cancer Screening:     General: Screening Not Indicated      Hepatitis C Screening:    General: Screening Current    Other Counseling Topics:   Calcium and vitamin D intake and regular weightbearing exercise         Martell Adams MD

## 2020-03-18 NOTE — PATIENT INSTRUCTIONS
Medicare Preventive Visit Patient Instructions  Thank you for completing your Welcome to Medicare Visit or Medicare Annual Wellness Visit today  Your next wellness visit will be due in one year (3/18/2021)  The screening/preventive services that you may require over the next 5-10 years are detailed below  Some tests may not apply to you based off risk factors and/or age  Screening tests ordered at today's visit but not completed yet may show as past due  Also, please note that scanned in results may not display below  Preventive Screenings:  Service Recommendations Previous Testing/Comments   Colorectal Cancer Screening  · Colonoscopy    · Fecal Occult Blood Test (FOBT)/Fecal Immunochemical Test (FIT)  · Fecal DNA/Cologuard Test  · Flexible Sigmoidoscopy Age: 54-65 years old   Colonoscopy: every 10 years (May be performed more frequently if at higher risk)  OR  FOBT/FIT: every 1 year  OR  Cologuard: every 3 years  OR  Sigmoidoscopy: every 5 years  Screening may be recommended earlier than age 48 if at higher risk for colorectal cancer  Also, an individualized decision between you and your healthcare provider will decide whether screening between the ages of 74-80 would be appropriate   Colonoscopy: 03/16/2017  FOBT/FIT: Not on file  Cologuard: Not on file  Sigmoidoscopy: Not on file    Screening Current     Prostate Cancer Screening Individualized decision between patient and health care provider in men between ages of 53-78   Medicare will cover every 12 months beginning on the day after your 50th birthday PSA: 1 7 ng/mL     Screening Current     Hepatitis C Screening Once for adults born between [facility]  More frequently in patients at high risk for Hepatitis C Hep C Antibody: Not on file    Screening Current   Diabetes Screening 1-2 times per year if you're at risk for diabetes or have pre-diabetes Fasting glucose: 88 mg/dL   A1C: No results in last 5 years    Screening Current   Cholesterol Screening Once every 5 years if you don't have a lipid disorder  May order more often based on risk factors  Lipid panel: 03/26/2019    Screening Current      Other Preventive Screenings Covered by Medicare:  1  Abdominal Aortic Aneurysm (AAA) Screening: covered once if your at risk  You're considered to be at risk if you have a family history of AAA or a male between the age of 73-68 who smoking at least 100 cigarettes in your lifetime  2  Lung Cancer Screening: covers low dose CT scan once per year if you meet all of the following conditions: (1) Age 50-69; (2) No signs or symptoms of lung cancer; (3) Current smoker or have quit smoking within the last 15 years; (4) You have a tobacco smoking history of at least 30 pack years (packs per day x number of years you smoked); (5) You get a written order from a healthcare provider  3  Glaucoma Screening: covered annually if you're considered high risk: (1) You have diabetes OR (2) Family history of glaucoma OR (3)  aged 48 and older OR (3)  American aged 72 and older  3  Osteoporosis Screening: covered every 2 years if you meet one of the following conditions: (1) Have a vertebral abnormality; (2) On glucocorticoid therapy for more than 3 months; (3) Have primary hyperparathyroidism; (4) On osteoporosis medications and need to assess response to drug therapy  5  HIV Screening: covered annually if you're between the age of 12-76  Also covered annually if you are younger than 13 and older than 72 with risk factors for HIV infection  For pregnant patients, it is covered up to 3 times per pregnancy      Immunizations:  Immunization Recommendations   Influenza Vaccine Annual influenza vaccination during flu season is recommended for all persons aged >= 6 months who do not have contraindications   Pneumococcal Vaccine (Prevnar and Pneumovax)  * Prevnar = PCV13  * Pneumovax = PPSV23 Adults 25-60 years old: 1-3 doses may be recommended based on certain risk factors  Adults 72 years old: Prevnar (PCV13) vaccine recommended followed by Pneumovax (PPSV23) vaccine  If already received PPSV23 since turning 65, then PCV13 recommended at least one year after PPSV23 dose  Hepatitis B Vaccine 3 dose series if at intermediate or high risk (ex: diabetes, end stage renal disease, liver disease)   Tetanus (Td) Vaccine - COST NOT COVERED BY MEDICARE PART B Following completion of primary series, a booster dose should be given every 10 years to maintain immunity against tetanus  Td may also be given as tetanus wound prophylaxis  Tdap Vaccine - COST NOT COVERED BY MEDICARE PART B Recommended at least once for all adults  For pregnant patients, recommended with each pregnancy  Shingles Vaccine (Shingrix) - COST NOT COVERED BY MEDICARE PART B  2 shot series recommended in those aged 48 and above     Health Maintenance Due:      Topic Date Due    CRC Screening: Colonoscopy  02/21/2023    Hepatitis C Screening  Addressed     Immunizations Due:      Topic Date Due    Pneumococcal Vaccine: 65+ Years (1 of 2 - PCV13) 04/24/2014     Advance Directives   What are advance directives? Advance directives are legal documents that state your wishes and plans for medical care  These plans are made ahead of time in case you lose your ability to make decisions for yourself  Advance directives can apply to any medical decision, such as the treatments you want, and if you want to donate organs  What are the types of advance directives? There are many types of advance directives, and each state has rules about how to use them  You may choose a combination of any of the following:  · Living will: This is a written record of the treatment you want  You can also choose which treatments you do not want, which to limit, and which to stop at a certain time  This includes surgery, medicine, IV fluid, and tube feedings  · Durable power of  for healthcare [facility]):   This is a written record that states who you want to make healthcare choices for you when you are unable to make them for yourself  This person, called a proxy, is usually a family member or a friend  You may choose more than 1 proxy  · Do not resuscitate (DNR) order:  A DNR order is used in case your heart stops beating or you stop breathing  It is a request not to have certain forms of treatment, such as CPR  A DNR order may be included in other types of advance directives  · Medical directive: This covers the care that you want if you are in a coma, near death, or unable to make decisions for yourself  You can list the treatments you want for each condition  Treatment may include pain medicine, surgery, blood transfusions, dialysis, IV or tube feedings, and a ventilator (breathing machine)  · Values history: This document has questions about your views, beliefs, and how you feel and think about life  This information can help others choose the care that you would choose  Why are advance directives important? An advance directive helps you control your care  Although spoken wishes may be used, it is better to have your wishes written down  Spoken wishes can be misunderstood, or not followed  Treatments may be given even if you do not want them  An advance directive may make it easier for your family to make difficult choices about your care  © Copyright [employer] 2018 Information is for End User's use only and may not be sold, redistributed or otherwise used for commercial purposes   All illustrations and images included in CareNotes® are the copyrighted property of [employer]  or [address] [place] [address] [employer]

## 2020-07-31 ENCOUNTER — LAB (OUTPATIENT)
Dept: LAB | Facility: CLINIC | Age: 70
End: 2020-07-31
Payer: MEDICARE

## 2020-07-31 DIAGNOSIS — Z12.5 SCREENING FOR PROSTATE CANCER: ICD-10-CM

## 2020-07-31 LAB
ALBUMIN SERPL BCP-MCNC: 3.7 G/DL (ref 3.5–5)
ALP SERPL-CCNC: 63 U/L (ref 46–116)
ALT SERPL W P-5'-P-CCNC: 25 U/L (ref 12–78)
ANION GAP SERPL CALCULATED.3IONS-SCNC: 9 MMOL/L (ref 4–13)
AST SERPL W P-5'-P-CCNC: 15 U/L (ref 5–45)
BASOPHILS # BLD AUTO: 0.04 THOUSANDS/ΜL (ref 0–0.1)
BASOPHILS NFR BLD AUTO: 1 % (ref 0–1)
BILIRUB SERPL-MCNC: 0.98 MG/DL (ref 0.2–1)
BUN SERPL-MCNC: 18 MG/DL (ref 5–25)
CALCIUM SERPL-MCNC: 9.3 MG/DL (ref 8.3–10.1)
CHLORIDE SERPL-SCNC: 108 MMOL/L (ref 100–108)
CO2 SERPL-SCNC: 25 MMOL/L (ref 21–32)
CREAT SERPL-MCNC: 0.99 MG/DL (ref 0.6–1.3)
EOSINOPHIL # BLD AUTO: 0.12 THOUSAND/ΜL (ref 0–0.61)
EOSINOPHIL NFR BLD AUTO: 3 % (ref 0–6)
ERYTHROCYTE [DISTWIDTH] IN BLOOD BY AUTOMATED COUNT: 12.9 % (ref 11.6–15.1)
GFR SERPL CREATININE-BSD FRML MDRD: 76 ML/MIN/1.73SQ M
GLUCOSE P FAST SERPL-MCNC: 87 MG/DL (ref 65–99)
HCT VFR BLD AUTO: 44.1 % (ref 36.5–49.3)
HGB BLD-MCNC: 14.6 G/DL (ref 12–17)
IMM GRANULOCYTES # BLD AUTO: 0.01 THOUSAND/UL (ref 0–0.2)
IMM GRANULOCYTES NFR BLD AUTO: 0 % (ref 0–2)
LYMPHOCYTES # BLD AUTO: 1.65 THOUSANDS/ΜL (ref 0.6–4.47)
LYMPHOCYTES NFR BLD AUTO: 38 % (ref 14–44)
MCH RBC QN AUTO: 29.9 PG (ref 26.8–34.3)
MCHC RBC AUTO-ENTMCNC: 33.1 G/DL (ref 31.4–37.4)
MCV RBC AUTO: 90 FL (ref 82–98)
MONOCYTES # BLD AUTO: 0.46 THOUSAND/ΜL (ref 0.17–1.22)
MONOCYTES NFR BLD AUTO: 11 % (ref 4–12)
NEUTROPHILS # BLD AUTO: 2.03 THOUSANDS/ΜL (ref 1.85–7.62)
NEUTS SEG NFR BLD AUTO: 47 % (ref 43–75)
NRBC BLD AUTO-RTO: 0 /100 WBCS
PLATELET # BLD AUTO: 208 THOUSANDS/UL (ref 149–390)
PMV BLD AUTO: 11.4 FL (ref 8.9–12.7)
POTASSIUM SERPL-SCNC: 3.8 MMOL/L (ref 3.5–5.3)
PROT SERPL-MCNC: 6.8 G/DL (ref 6.4–8.2)
PSA SERPL-MCNC: 2 NG/ML (ref 0–4)
RBC # BLD AUTO: 4.88 MILLION/UL (ref 3.88–5.62)
SODIUM SERPL-SCNC: 142 MMOL/L (ref 136–145)
TSH SERPL DL<=0.05 MIU/L-ACNC: 3.54 UIU/ML (ref 0.36–3.74)
WBC # BLD AUTO: 4.31 THOUSAND/UL (ref 4.31–10.16)

## 2020-07-31 PROCEDURE — 85025 COMPLETE CBC W/AUTO DIFF WBC: CPT | Performed by: FAMILY MEDICINE

## 2020-07-31 PROCEDURE — G0103 PSA SCREENING: HCPCS

## 2020-07-31 PROCEDURE — 36415 COLL VENOUS BLD VENIPUNCTURE: CPT | Performed by: FAMILY MEDICINE

## 2020-07-31 PROCEDURE — 80053 COMPREHEN METABOLIC PANEL: CPT | Performed by: FAMILY MEDICINE

## 2020-07-31 PROCEDURE — 84443 ASSAY THYROID STIM HORMONE: CPT | Performed by: FAMILY MEDICINE

## 2020-08-03 DIAGNOSIS — I10 ESSENTIAL HYPERTENSION: Primary | ICD-10-CM

## 2020-08-03 NOTE — RESULT ENCOUNTER NOTE
Anjel Beltran your PSA testing is normal at 2 0  Normal range 0 to 4       Lab Results       Component                Value               Date                       PSA                      2 0                 07/31/2020                 PSA                      1 7                 03/26/2019                 PSA                      1 8                 03/20/2018

## 2020-08-03 NOTE — RESULT ENCOUNTER NOTE
Kervin Marcelo your labs are normal  This included a normal fasting blood sugar at 87 < 100 normal  Normal kidney and liver tests  Normal CBC or blood count   TSH or thyroid test normal

## 2021-02-13 DIAGNOSIS — Z23 ENCOUNTER FOR IMMUNIZATION: ICD-10-CM

## 2021-02-15 ENCOUNTER — IMMUNIZATIONS (OUTPATIENT)
Dept: FAMILY MEDICINE CLINIC | Facility: HOSPITAL | Age: 71
End: 2021-02-15

## 2021-02-15 DIAGNOSIS — Z23 ENCOUNTER FOR IMMUNIZATION: Primary | ICD-10-CM

## 2021-02-15 PROCEDURE — 91300 SARS-COV-2 / COVID-19 MRNA VACCINE (PFIZER-BIONTECH) 30 MCG: CPT

## 2021-02-15 PROCEDURE — 0001A SARS-COV-2 / COVID-19 MRNA VACCINE (PFIZER-BIONTECH) 30 MCG: CPT

## 2021-03-03 DIAGNOSIS — I10 ESSENTIAL HYPERTENSION: ICD-10-CM

## 2021-03-03 RX ORDER — LISINOPRIL 10 MG/1
TABLET ORAL
Qty: 90 TABLET | Refills: 3 | Status: SHIPPED | OUTPATIENT
Start: 2021-03-03 | End: 2022-03-07 | Stop reason: SDUPTHER

## 2021-03-08 ENCOUNTER — IMMUNIZATIONS (OUTPATIENT)
Dept: FAMILY MEDICINE CLINIC | Facility: HOSPITAL | Age: 71
End: 2021-03-08

## 2021-03-08 DIAGNOSIS — Z23 ENCOUNTER FOR IMMUNIZATION: Primary | ICD-10-CM

## 2021-03-08 PROCEDURE — 0002A SARS-COV-2 / COVID-19 MRNA VACCINE (PFIZER-BIONTECH) 30 MCG: CPT | Performed by: NURSE PRACTITIONER

## 2021-03-08 PROCEDURE — 91300 SARS-COV-2 / COVID-19 MRNA VACCINE (PFIZER-BIONTECH) 30 MCG: CPT | Performed by: NURSE PRACTITIONER

## 2021-03-31 ENCOUNTER — OFFICE VISIT (OUTPATIENT)
Dept: FAMILY MEDICINE CLINIC | Facility: CLINIC | Age: 71
End: 2021-03-31
Payer: MEDICARE

## 2021-03-31 VITALS
BODY MASS INDEX: 20.99 KG/M2 | HEIGHT: 72 IN | WEIGHT: 155 LBS | HEART RATE: 64 BPM | RESPIRATION RATE: 16 BRPM | DIASTOLIC BLOOD PRESSURE: 64 MMHG | TEMPERATURE: 97.4 F | SYSTOLIC BLOOD PRESSURE: 122 MMHG

## 2021-03-31 DIAGNOSIS — Z12.5 SCREENING FOR PROSTATE CANCER: ICD-10-CM

## 2021-03-31 DIAGNOSIS — Z00.00 MEDICARE ANNUAL WELLNESS VISIT, SUBSEQUENT: Primary | ICD-10-CM

## 2021-03-31 DIAGNOSIS — E04.1 THYROID NODULE: ICD-10-CM

## 2021-03-31 DIAGNOSIS — I10 ESSENTIAL HYPERTENSION: ICD-10-CM

## 2021-03-31 PROCEDURE — 1123F ACP DISCUSS/DSCN MKR DOCD: CPT | Performed by: FAMILY MEDICINE

## 2021-03-31 PROCEDURE — G0439 PPPS, SUBSEQ VISIT: HCPCS | Performed by: FAMILY MEDICINE

## 2021-03-31 NOTE — PROGRESS NOTES
Assessment and Plan:     Problem List Items Addressed This Visit        Endocrine    Thyroid nodule    Relevant Orders    US thyroid    TSH, 3rd generation with Free T4 reflex       Cardiovascular and Mediastinum    Hypertension    Relevant Orders    CBC and differential    Comprehensive metabolic panel      Other Visit Diagnoses     Medicare annual wellness visit, subsequent    -  Primary    Screening for prostate cancer        Relevant Orders    PSA, Total Screen           Preventive health issues were discussed with patient, and age appropriate screening tests were ordered as noted in patient's After Visit Summary  Personalized health advice and appropriate referrals for health education or preventive services given if needed, as noted in patient's After Visit Summary  Patient completed COVID-19 vaccine series    Office visit 1 year     History of Present Illness:     Patient presents for Medicare Annual Wellness visit    Patient Care Team:  Chandler Gordon MD as PCP - General  Mi Willis MD (Dermatology)     Problem List:     Patient Active Problem List   Diagnosis    Hypertension    Thyroid nodule    Leukopenia      Past Medical and Surgical History:     Past Medical History:   Diagnosis Date    Agent orange exposure     while serving in [address] [place] [address] [place] [employer] Score cell carcinoma     Diverticulosis     Dysplastic nevus     Hemorrhoids     Inguinal hernia     last assessed 01/25/2013    Nephrolithiasis     resolved 03/03/2014     Past Surgical History:   Procedure Laterality Date    INGUINAL HERNIA REPAIR Right     MALIGNANT SKIN LESION EXCISION      face and trunk      Family History:     Family History   Problem Relation Age of Onset    Macular degeneration Mother     Aneurysm Father     Coronary artery disease Father     Diabetes type II Father       Social History:        Social History     Socioeconomic History    Marital status: /Civil Union     Spouse name: None    Number of children: None    Years of education: None    Highest education level: None   Occupational History    None   Social Needs    Financial resource strain: None    Food insecurity     Worry: None     Inability: None    Transportation needs     Medical: None     Non-medical: None   Tobacco Use    Smoking status: Never Smoker    Smokeless tobacco: Never Used   Substance and Sexual Activity    Alcohol use: Yes     Frequency: Monthly or less     Comment: rare    Drug use: No    Sexual activity: None   Lifestyle    Physical activity     Days per week: None     Minutes per session: None    Stress: None   Relationships    Social connections     Talks on phone: None     Gets together: None     Attends Yazidism service: None     Active member of club or organization: None     Attends meetings of clubs or organizations: None     Relationship status: None    Intimate partner violence     Fear of current or ex partner: None     Emotionally abused: None     Physically abused: None     Forced sexual activity: None   Other Topics Concern    None   Social History Narrative    None      Medications and Allergies:     Current Outpatient Medications   Medication Sig Dispense Refill    aspirin 81 MG tablet Take 1 tablet by mouth daily      lisinopril (ZESTRIL) 10 mg tablet TAKE 1 TABLET BY MOUTH EVERY DAY 90 tablet 3     No current facility-administered medications for this visit        No Known Allergies   Immunizations:     Immunization History   Administered Date(s) Administered    INFLUENZA 10/12/2018, 10/10/2019    Influenza Split High Dose Preservative Free IM 10/12/2018, 10/10/2019    Influenza, high dose seasonal 0 7 mL 10/07/2020    SARS-CoV-2 / COVID-19 mRNA IM (CO Everywhere) 02/15/2021, 03/08/2021    Tdap 03/09/2017    Zoster 12/22/2014      Health Maintenance:         Topic Date Due    Colonoscopy Surveillance  03/16/2022    Colorectal Cancer Screening  02/21/2028    Hepatitis C Screening  Addressed Topic Date Due    Pneumococcal Vaccine: 65+ Years (1 of 1 - PPSV23) Never done      Medicare Health Risk Assessment:     /64   Pulse 64   Temp (!) 97 4 °F (36 3 °C)   Resp 16   Ht 6' (1 829 m)   Wt 70 3 kg (155 lb)   BMI 21 02 kg/m²      BP Readings from Last 3 Encounters:   03/31/21 122/64   03/18/20 136/84   02/22/19 138/90     Wt Readings from Last 3 Encounters:   03/31/21 70 3 kg (155 lb)   03/18/20 72 1 kg (159 lb)   02/22/19 73 kg (161 lb)     Review of Systems   Constitutional: Negative for appetite change, chills, fever and unexpected weight change  HENT: Negative for congestion, ear pain, hearing loss, rhinorrhea, sore throat, trouble swallowing and voice change  History of thyroid nodules  09/2019 thyroid u/s right upper lobe stable circumcised hypoechoic nodule measuring 5 x 3 x 4 mm  Stable rim calcified nodule in the posterior mid right gland 7 x 6 x 7 mm  Impression stable right thyroid nodules compared to last exam 03/2018  Thyroid ultrasound 03/2018 thyroid ultrasound compared to study 03/2017 stable hypoechoic nodule within upper pole of the right lobe 5 3 mm x 3 2 mm x 4 6 mm  stable calcified nodule within the mid right lobe 7 x 6 x 7 mm  03/2017 TSH 2 650  History of radiation for acne as a teenager  Eyes: Negative for visual disturbance  Respiratory: Negative for cough, shortness of breath and wheezing  Cardiovascular: Negative for chest pain, palpitations and leg swelling  Gastrointestinal: Negative for abdominal pain, blood in stool, constipation, diarrhea, nausea and vomiting          03/2017 colonoscopy   Endocrine: Negative for polydipsia and polyuria  See ENT ROS  03/2019 TSH 3 490  Genitourinary: Negative for difficulty urinating         03/2019 PSA 1 7   Musculoskeletal: Negative for arthralgias and myalgias  Skin: Negative for rash  Allergic/Immunologic: Negative for environmental allergies     Neurological: Negative for dizziness and headaches  Hematological: Negative for adenopathy  Does not bruise/bleed easily  History of mild stable leukopenia  03/2019 CBC WBC 4,100  Hgb 14 7  MCV 89  Platelets 359,476  Lab Results       Component                Value               Date                       WBC                      4 31                07/31/2020                 HGB                      14 6                07/31/2020                 HCT                      44 1                07/31/2020                 MCV                      90                  07/31/2020                 PLT                      208                 07/31/2020                      WBC       Date                     Value               Ref Range           Status                07/31/2020               4 31                4 31 - 10 16 T*     Final                 03/26/2019               4 11 (L)            4 31 - 10 16 T*     Final                 03/14/2018               4 58                4 31 - 10 16 T*     Final                 04/10/2014               4 42                4 31 - 10 16 T*     Final                    Psychiatric/Behavioral: Negative for dysphoric mood and sleep disturbance  Physical Exam  Constitutional:       General: He is not in acute distress  HENT:      Right Ear: Tympanic membrane normal       Left Ear: Tympanic membrane normal    Eyes:      General: No scleral icterus  Extraocular Movements: Extraocular movements intact  Conjunctiva/sclera: Conjunctivae normal       Pupils: Pupils are equal, round, and reactive to light  Neck:      Thyroid: No thyroid mass, thyromegaly or thyroid tenderness  Vascular: No carotid bruit  Cardiovascular:      Rate and Rhythm: Normal rate and regular rhythm  Heart sounds: No murmur  Pulmonary:      Effort: Pulmonary effort is normal       Breath sounds: Normal breath sounds  No wheezing or rales     Abdominal:      General: Bowel sounds are normal  There is no distension or abdominal bruit  Palpations: Abdomen is soft  There is no mass  Tenderness: There is no abdominal tenderness  There is no guarding or rebound  Musculoskeletal:      Right lower leg: No edema  Left lower leg: No edema  Lymphadenopathy:      Cervical: No cervical adenopathy  Skin:     Findings: No rash  Neurological:      Mental Status: He is alert and oriented to person, place, and time  Psychiatric:         Mood and Affect: Mood normal          Behavior: Behavior normal          Cognition and Memory: Cognition normal          Indira Rodriguez is here for his Subsequent Wellness visit  Last Medicare Wellness visit information reviewed, patient interviewed and updates made to the record today  Health Risk Assessment:   Patient rates overall health as very good  Patient feels that their physical health rating is same  Eyesight was rated as same  Hearing was rated as same  Patient feels that their emotional and mental health rating is same  Patients states they are never, rarely angry  Patient states they are never, rarely unusually tired/fatigued  Pain experienced in the last 7 days has been some  Patient's pain rating has been 5/10  Patient states that he has experienced no weight loss or gain in last 6 months  Depression Screening:   PHQ-2 Score: 0      Fall Risk Screening: In the past year, patient has experienced: no history of falling in past year      Home Safety:  Patient does not have trouble with stairs inside or outside of their home  Patient has working smoke alarms and has working carbon monoxide detector  Home safety hazards include: none  Nutrition:   Current diet is Regular, No Added Salt and Limited junk food  Medications:   Patient is not currently taking any over-the-counter supplements  Patient is able to manage medications       Activities of Daily Living (ADLs)/Instrumental Activities of Daily Living (IADLs):   Walk and transfer into and out of bed and chair?: Yes  Dress and groom yourself?: Yes    Bathe or shower yourself?: Yes    Feed yourself? Yes  Do your laundry/housekeeping?: Yes  Manage your money, pay your bills and track your expenses?: Yes  Make your own meals?: Yes    Do your own shopping?: Yes    Previous Hospitalizations:   Any hospitalizations or ED visits within the last 12 months?: No      Advance Care Planning:   Living will: No      Cognitive Screening:   Provider or family/friend/caregiver concerned regarding cognition?: No    PREVENTIVE SCREENINGS      Cardiovascular Screening:    General: Screening Current      Diabetes Screening:     General: Screening Current      Colorectal Cancer Screening:     General: Screening Current      Prostate Cancer Screening:    General: Screening Current      Osteoporosis Screening:    General: Screening Not Indicated      Abdominal Aortic Aneurysm (AAA) Screening:    Risk factors include: age between 73-69 yo        General: Screening Not Indicated      Lung Cancer Screening:     General: Screening Not Indicated      Hepatitis C Screening:    General: Screening Current    Screening, Brief Intervention, and Referral to Treatment (SBIRT)    Screening  Typical number of drinks in a day: 0  Typical number of drinks in a week: 0  Interpretation: Low risk drinking behavior  Single Item Drug Screening:  How often have you used an illegal drug (including marijuana) or a prescription medication for non-medical reasons in the past year? never    Single Item Drug Screen Score: 0  Interpretation: Negative screen for possible drug use disorder    Other Counseling Topics:   Calcium and vitamin D intake and regular weightbearing exercise         Juan Diego Baptiste MD

## 2021-03-31 NOTE — PATIENT INSTRUCTIONS
Medicare Preventive Visit Patient Instructions  Thank you for completing your Welcome to Medicare Visit or Medicare Annual Wellness Visit today  Your next wellness visit will be due in one year (4/1/2022)  The screening/preventive services that you may require over the next 5-10 years are detailed below  Some tests may not apply to you based off risk factors and/or age  Screening tests ordered at today's visit but not completed yet may show as past due  Also, please note that scanned in results may not display below  Preventive Screenings:  Service Recommendations Previous Testing/Comments   Colorectal Cancer Screening  · Colonoscopy    · Fecal Occult Blood Test (FOBT)/Fecal Immunochemical Test (FIT)  · Fecal DNA/Cologuard Test  · Flexible Sigmoidoscopy Age: 54-65 years old   Colonoscopy: every 10 years (May be performed more frequently if at higher risk)  OR  FOBT/FIT: every 1 year  OR  Cologuard: every 3 years  OR  Sigmoidoscopy: every 5 years  Screening may be recommended earlier than age 48 if at higher risk for colorectal cancer  Also, an individualized decision between you and your healthcare provider will decide whether screening between the ages of 74-80 would be appropriate   Colonoscopy: 03/16/2017  FOBT/FIT: Not on file  Cologuard: Not on file  Sigmoidoscopy: Not on file    Screening Current     Prostate Cancer Screening Individualized decision between patient and health care provider in men between ages of 53-78   Medicare will cover every 12 months beginning on the day after your 50th birthday PSA: 2 0 ng/mL     Screening Current     Hepatitis C Screening Once for adults born between [facility]  More frequently in patients at high risk for Hepatitis C Hep C Antibody: Not on file    Screening Current   Diabetes Screening 1-2 times per year if you're at risk for diabetes or have pre-diabetes Fasting glucose: 87 mg/dL   A1C: No results in last 5 years    Screening Current   Cholesterol Screening Once every 5 years if you don't have a lipid disorder  May order more often based on risk factors  Lipid panel: 03/26/2019    Screening Current      Other Preventive Screenings Covered by Medicare:  1  Abdominal Aortic Aneurysm (AAA) Screening: covered once if your at risk  You're considered to be at risk if you have a family history of AAA or a male between the age of 73-68 who smoking at least 100 cigarettes in your lifetime  2  Lung Cancer Screening: covers low dose CT scan once per year if you meet all of the following conditions: (1) Age 50-69; (2) No signs or symptoms of lung cancer; (3) Current smoker or have quit smoking within the last 15 years; (4) You have a tobacco smoking history of at least 30 pack years (packs per day x number of years you smoked); (5) You get a written order from a healthcare provider  3  Glaucoma Screening: covered annually if you're considered high risk: (1) You have diabetes OR (2) Family history of glaucoma OR (3)  aged 48 and older OR (3)  American aged 72 and older  3  Osteoporosis Screening: covered every 2 years if you meet one of the following conditions: (1) Have a vertebral abnormality; (2) On glucocorticoid therapy for more than 3 months; (3) Have primary hyperparathyroidism; (4) On osteoporosis medications and need to assess response to drug therapy  5  HIV Screening: covered annually if you're between the age of 12-76  Also covered annually if you are younger than 13 and older than 72 with risk factors for HIV infection  For pregnant patients, it is covered up to 3 times per pregnancy      Immunizations:  Immunization Recommendations   Influenza Vaccine Annual influenza vaccination during flu season is recommended for all persons aged >= 6 months who do not have contraindications   Pneumococcal Vaccine (Prevnar and Pneumovax)  * Prevnar = PCV13  * Pneumovax = PPSV23 Adults 25-60 years old: 1-3 doses may be recommended based on certain risk factors  Adults 72 years old: Prevnar (PCV13) vaccine recommended followed by Pneumovax (PPSV23) vaccine  If already received PPSV23 since turning 65, then PCV13 recommended at least one year after PPSV23 dose  Hepatitis B Vaccine 3 dose series if at intermediate or high risk (ex: diabetes, end stage renal disease, liver disease)   Tetanus (Td) Vaccine - COST NOT COVERED BY MEDICARE PART B Following completion of primary series, a booster dose should be given every 10 years to maintain immunity against tetanus  Td may also be given as tetanus wound prophylaxis  Tdap Vaccine - COST NOT COVERED BY MEDICARE PART B Recommended at least once for all adults  For pregnant patients, recommended with each pregnancy  Shingles Vaccine (Shingrix) - COST NOT COVERED BY MEDICARE PART B  2 shot series recommended in those aged 48 and above     Health Maintenance Due:      Topic Date Due    Colonoscopy Surveillance  03/16/2022    Colorectal Cancer Screening  02/21/2028    Hepatitis C Screening  Addressed     Immunizations Due:      Topic Date Due    Pneumococcal Vaccine: 65+ Years (1 of 1 - PPSV23) Never done     Advance Directives   What are advance directives? Advance directives are legal documents that state your wishes and plans for medical care  These plans are made ahead of time in case you lose your ability to make decisions for yourself  Advance directives can apply to any medical decision, such as the treatments you want, and if you want to donate organs  What are the types of advance directives? There are many types of advance directives, and each state has rules about how to use them  You may choose a combination of any of the following:  · Living will: This is a written record of the treatment you want  You can also choose which treatments you do not want, which to limit, and which to stop at a certain time  This includes surgery, medicine, IV fluid, and tube feedings     · Durable power of  for [facility]): This is a written record that states who you want to make healthcare choices for you when you are unable to make them for yourself  This person, called a proxy, is usually a family member or a friend  You may choose more than 1 proxy  · Do not resuscitate (DNR) order:  A DNR order is used in case your heart stops beating or you stop breathing  It is a request not to have certain forms of treatment, such as CPR  A DNR order may be included in other types of advance directives  · Medical directive: This covers the care that you want if you are in a coma, near death, or unable to make decisions for yourself  You can list the treatments you want for each condition  Treatment may include pain medicine, surgery, blood transfusions, dialysis, IV or tube feedings, and a ventilator (breathing machine)  · Values history: This document has questions about your views, beliefs, and how you feel and think about life  This information can help others choose the care that you would choose  Why are advance directives important? An advance directive helps you control your care  Although spoken wishes may be used, it is better to have your wishes written down  Spoken wishes can be misunderstood, or not followed  Treatments may be given even if you do not want them  An advance directive may make it easier for your family to make difficult choices about your care  © Copyright "[employer]" 2018 Information is for End User's use only and may not be sold, redistributed or otherwise used for commercial purposes   All illustrations and images included in CareNotes® are the copyrighted property of [employer]  or [address] [place] [address] [facility]

## 2021-08-17 ENCOUNTER — APPOINTMENT (OUTPATIENT)
Dept: LAB | Facility: CLINIC | Age: 71
End: 2021-08-17
Payer: MEDICARE

## 2021-08-17 DIAGNOSIS — Z12.5 SCREENING FOR PROSTATE CANCER: ICD-10-CM

## 2021-08-17 LAB
ALBUMIN SERPL BCP-MCNC: 3.5 G/DL (ref 3.5–5)
ALP SERPL-CCNC: 65 U/L (ref 46–116)
ALT SERPL W P-5'-P-CCNC: 27 U/L (ref 12–78)
ANION GAP SERPL CALCULATED.3IONS-SCNC: 5 MMOL/L (ref 4–13)
AST SERPL W P-5'-P-CCNC: 13 U/L (ref 5–45)
BASOPHILS # BLD AUTO: 0.06 THOUSANDS/ΜL (ref 0–0.1)
BASOPHILS NFR BLD AUTO: 2 % (ref 0–1)
BILIRUB SERPL-MCNC: 1.1 MG/DL (ref 0.2–1)
BUN SERPL-MCNC: 17 MG/DL (ref 5–25)
CALCIUM SERPL-MCNC: 8.4 MG/DL (ref 8.3–10.1)
CHLORIDE SERPL-SCNC: 105 MMOL/L (ref 100–108)
CHOLEST SERPL-MCNC: 150 MG/DL (ref 50–200)
CO2 SERPL-SCNC: 29 MMOL/L (ref 21–32)
CREAT SERPL-MCNC: 0.95 MG/DL (ref 0.6–1.3)
EOSINOPHIL # BLD AUTO: 0.12 THOUSAND/ΜL (ref 0–0.61)
EOSINOPHIL NFR BLD AUTO: 3 % (ref 0–6)
ERYTHROCYTE [DISTWIDTH] IN BLOOD BY AUTOMATED COUNT: 12.8 % (ref 11.6–15.1)
GFR SERPL CREATININE-BSD FRML MDRD: 80 ML/MIN/1.73SQ M
GLUCOSE P FAST SERPL-MCNC: 87 MG/DL (ref 65–99)
HCT VFR BLD AUTO: 43 % (ref 36.5–49.3)
HDLC SERPL-MCNC: 77 MG/DL
HGB BLD-MCNC: 14.5 G/DL (ref 12–17)
IMM GRANULOCYTES # BLD AUTO: 0 THOUSAND/UL (ref 0–0.2)
IMM GRANULOCYTES NFR BLD AUTO: 0 % (ref 0–2)
LDLC SERPL CALC-MCNC: 67 MG/DL (ref 0–100)
LYMPHOCYTES # BLD AUTO: 1.59 THOUSANDS/ΜL (ref 0.6–4.47)
LYMPHOCYTES NFR BLD AUTO: 39 % (ref 14–44)
MCH RBC QN AUTO: 30.5 PG (ref 26.8–34.3)
MCHC RBC AUTO-ENTMCNC: 33.7 G/DL (ref 31.4–37.4)
MCV RBC AUTO: 90 FL (ref 82–98)
MONOCYTES # BLD AUTO: 0.47 THOUSAND/ΜL (ref 0.17–1.22)
MONOCYTES NFR BLD AUTO: 12 % (ref 4–12)
NEUTROPHILS # BLD AUTO: 1.86 THOUSANDS/ΜL (ref 1.85–7.62)
NEUTS SEG NFR BLD AUTO: 44 % (ref 43–75)
NONHDLC SERPL-MCNC: 73 MG/DL
NRBC BLD AUTO-RTO: 0 /100 WBCS
PLATELET # BLD AUTO: 196 THOUSANDS/UL (ref 149–390)
PMV BLD AUTO: 10.9 FL (ref 8.9–12.7)
POTASSIUM SERPL-SCNC: 3.7 MMOL/L (ref 3.5–5.3)
PROT SERPL-MCNC: 6.5 G/DL (ref 6.4–8.2)
PSA SERPL-MCNC: 2.4 NG/ML (ref 0–4)
RBC # BLD AUTO: 4.76 MILLION/UL (ref 3.88–5.62)
SODIUM SERPL-SCNC: 139 MMOL/L (ref 136–145)
TRIGL SERPL-MCNC: 31 MG/DL
TSH SERPL DL<=0.05 MIU/L-ACNC: 3.54 UIU/ML (ref 0.36–3.74)
WBC # BLD AUTO: 4.1 THOUSAND/UL (ref 4.31–10.16)

## 2021-08-17 PROCEDURE — 80061 LIPID PANEL: CPT | Performed by: FAMILY MEDICINE

## 2021-08-17 PROCEDURE — G0103 PSA SCREENING: HCPCS

## 2021-08-17 PROCEDURE — 85025 COMPLETE CBC W/AUTO DIFF WBC: CPT | Performed by: FAMILY MEDICINE

## 2021-08-17 PROCEDURE — 36415 COLL VENOUS BLD VENIPUNCTURE: CPT | Performed by: FAMILY MEDICINE

## 2021-08-17 PROCEDURE — 84443 ASSAY THYROID STIM HORMONE: CPT | Performed by: FAMILY MEDICINE

## 2021-08-17 PROCEDURE — 80053 COMPREHEN METABOLIC PANEL: CPT | Performed by: FAMILY MEDICINE

## 2021-10-08 ENCOUNTER — TELEPHONE (OUTPATIENT)
Dept: FAMILY MEDICINE CLINIC | Facility: CLINIC | Age: 71
End: 2021-10-08

## 2021-10-16 ENCOUNTER — IMMUNIZATIONS (OUTPATIENT)
Dept: FAMILY MEDICINE CLINIC | Facility: HOSPITAL | Age: 71
End: 2021-10-16

## 2021-10-16 DIAGNOSIS — Z23 ENCOUNTER FOR IMMUNIZATION: Primary | ICD-10-CM

## 2021-10-16 PROCEDURE — 91300 SARS-COV-2 / COVID-19 MRNA VACCINE (PFIZER-BIONTECH) 30 MCG: CPT

## 2021-10-16 PROCEDURE — 0001A SARS-COV-2 / COVID-19 MRNA VACCINE (PFIZER-BIONTECH) 30 MCG: CPT

## 2021-11-04 ENCOUNTER — OFFICE VISIT (OUTPATIENT)
Dept: FAMILY MEDICINE CLINIC | Facility: CLINIC | Age: 71
End: 2021-11-04
Payer: MEDICARE

## 2021-11-04 VITALS
DIASTOLIC BLOOD PRESSURE: 80 MMHG | RESPIRATION RATE: 16 BRPM | TEMPERATURE: 97.3 F | WEIGHT: 148 LBS | SYSTOLIC BLOOD PRESSURE: 140 MMHG | BODY MASS INDEX: 20.05 KG/M2 | HEIGHT: 72 IN | HEART RATE: 72 BPM

## 2021-11-04 DIAGNOSIS — K40.90 LEFT INGUINAL HERNIA: Primary | ICD-10-CM

## 2021-11-04 DIAGNOSIS — Z82.49 FAMILY HISTORY OF ABDOMINAL AORTIC ANEURYSM (AAA): ICD-10-CM

## 2021-11-04 DIAGNOSIS — L82.1 SEBORRHEIC KERATOSIS: ICD-10-CM

## 2021-11-04 PROCEDURE — 99213 OFFICE O/P EST LOW 20 MIN: CPT | Performed by: FAMILY MEDICINE

## 2022-03-07 DIAGNOSIS — I10 ESSENTIAL HYPERTENSION: ICD-10-CM

## 2022-03-07 RX ORDER — LISINOPRIL 10 MG/1
10 TABLET ORAL DAILY
Qty: 90 TABLET | Refills: 3 | Status: SHIPPED | OUTPATIENT
Start: 2022-03-07

## 2022-04-13 ENCOUNTER — OFFICE VISIT (OUTPATIENT)
Dept: FAMILY MEDICINE CLINIC | Facility: CLINIC | Age: 72
End: 2022-04-13
Payer: MEDICARE

## 2022-04-13 VITALS
TEMPERATURE: 95.6 F | RESPIRATION RATE: 16 BRPM | DIASTOLIC BLOOD PRESSURE: 80 MMHG | WEIGHT: 150 LBS | HEART RATE: 62 BPM | BODY MASS INDEX: 20.32 KG/M2 | HEIGHT: 72 IN | SYSTOLIC BLOOD PRESSURE: 132 MMHG

## 2022-04-13 DIAGNOSIS — I10 ESSENTIAL HYPERTENSION: ICD-10-CM

## 2022-04-13 DIAGNOSIS — Z12.5 SCREENING FOR PROSTATE CANCER: ICD-10-CM

## 2022-04-13 DIAGNOSIS — Z13.89 ENCOUNTER FOR SCREENING FOR OTHER DISORDER: ICD-10-CM

## 2022-04-13 DIAGNOSIS — E04.1 THYROID NODULE: ICD-10-CM

## 2022-04-13 DIAGNOSIS — Z00.00 MEDICARE ANNUAL WELLNESS VISIT, SUBSEQUENT: Primary | ICD-10-CM

## 2022-04-13 PROCEDURE — G0439 PPPS, SUBSEQ VISIT: HCPCS | Performed by: FAMILY MEDICINE

## 2022-04-13 PROCEDURE — 1123F ACP DISCUSS/DSCN MKR DOCD: CPT | Performed by: FAMILY MEDICINE

## 2022-04-13 PROCEDURE — G0444 DEPRESSION SCREEN ANNUAL: HCPCS | Performed by: FAMILY MEDICINE

## 2022-04-13 NOTE — PROGRESS NOTES
Assessment and Plan:     Problem List Items Addressed This Visit        Endocrine    Thyroid nodule    Relevant Orders    US thyroid    TSH, 3rd generation with Free T4 reflex      Other Visit Diagnoses     Medicare annual wellness visit, subsequent    -  Primary    Screening for prostate cancer        Relevant Orders    PSA, Total Screen    Essential hypertension        Relevant Orders    Comprehensive metabolic panel    CBC and differential    Encounter for screening for other disorder              Depression Screening and Follow-up Plan: Patient was screened for depression during today's encounter  They screened negative with a PHQ-2 score of 0  Preventive health issues were discussed with patient, and age appropriate screening tests were ordered as noted in patient's After Visit Summary  Personalized health advice and appropriate referrals for health education or preventive services given if needed, as noted in patient's After Visit Summary  Repeat labs 08/2022  Follow up thyroid u/s 10/2022  He is scheduled for a colonoscopy  OV 1 year  History of Present Illness:     Patient presents for Medicare Annual Wellness visit    Review of Systems   Constitutional: Negative for appetite change, chills, fever and unexpected weight change  HENT: Negative for congestion, ear pain, hearing loss, rhinorrhea, sore throat, trouble swallowing and voice change  History of thyroid nodules  10/2021 benign-appearing right thyroid nodules  No new or suspicious thyroid nodules    History of radiation for acne as a teenager  Eyes: Negative for visual disturbance  Respiratory: Negative for cough, shortness of breath and wheezing  Cardiovascular: Negative for chest pain, palpitations and leg swelling  Hypertension blood pressures have been stable on Lisinopril 10 mg/day   11/2021 abd aorta u/s no AAA    Lab Results       Component                Value               Date CHOLESTEROL              150                 08/17/2021                 CHOLESTEROL              169                 03/26/2019                 CHOLESTEROL              164                 03/14/2018            Lab Results       Component                Value               Date                       HDL                      77                  08/17/2021                 HDL                      77 (H)              03/26/2019                 HDL                      76 (H)              03/14/2018            Lab Results       Component                Value               Date                       TRIG                     31                  08/17/2021                 TRIG                     38                  03/26/2019                 TRIG                     39                  03/14/2018            Lab Results       Component                Value               Date                       LDLCALC                  67                  08/17/2021                     Gastrointestinal: Negative for abdominal pain, blood in stool, constipation, diarrhea, nausea and vomiting          03/2017 colonoscopy   Endocrine: Negative for polydipsia and polyuria  See ENT ROS  Lab Results       Component                Value               Date                       [id number]             3 540               08/17/2021               Genitourinary: Negative for difficulty urinating  Lab Results       Component                Value               Date                       PSA                      2 4                 08/17/2021                 PSA                      2 0                 07/31/2020                 PSA                      1 7                 03/26/2019               Musculoskeletal: Negative for arthralgias and myalgias  Skin: Negative for rash  Allergic/Immunologic: Negative for environmental allergies  Neurological: Negative for dizziness and headaches     Hematological: Negative for adenopathy  Does not bruise/bleed easily  History of mild stable leukopenia  Lab Results       Component                Value               Date                       WBC                      4 10 (L)            08/17/2021                 HGB                      14 5                08/17/2021                 HCT                      43 0                08/17/2021                 MCV                      90                  08/17/2021                 PLT                      196                 08/17/2021                              WBC       Date                     Value               Ref Range           Status                07/31/2020               4 31                4 31 - 10 16 T*     Final                 03/26/2019               4 11 (L)            4 31 - 10 16 T*     Final                 03/14/2018               4 58                4 31 - 10 16 T*     Final                 04/10/2014               4 42                4 31 - 10 16 T*     Final                    Psychiatric/Behavioral: Negative for dysphoric mood and sleep disturbance                 Patient Care Team:  Geoffrey Beaulieu MD as PCP - Jeanna Toro MD (Dermatology)     Problem List:     Patient Active Problem List   Diagnosis    Hypertension    Thyroid nodule    Leukopenia      Past Medical and Surgical History:     Past Medical History:   Diagnosis Date    Agent orange exposure     while serving in [address] cell carcinoma     Diverticulosis     Dysplastic nevus     Hemorrhoids     Inguinal hernia     last assessed 01/25/2013    Nephrolithiasis     resolved 03/03/2014     Past Surgical History:   Procedure Laterality Date    INGUINAL HERNIA REPAIR Right     MALIGNANT SKIN LESION EXCISION      face and trunk      Family History:     Family History   Problem Relation Age of Onset    Macular degeneration Mother     Aneurysm Father     Coronary artery disease Father     Diabetes type II Father Social History:     Social History     Socioeconomic History    Marital status: /Civil Union     Spouse name: None    Number of children: None    Years of education: None    Highest education level: None   Occupational History    None   Tobacco Use    Smoking status: Never Smoker    Smokeless tobacco: Never Used   Substance and Sexual Activity    Alcohol use: Yes     Comment: rare    Drug use: No    Sexual activity: None   Other Topics Concern    None   Social History Narrative    None     Social Determinants of Health     Financial Resource Strain: Not on file   Food Insecurity: Not on file   Transportation Needs: Not on file   Physical Activity: Not on file   Stress: Not on file   Social Connections: Not on file   Intimate Partner Violence: Not on file   Housing Stability: Not on file      Medications and Allergies:     Current Outpatient Medications   Medication Sig Dispense Refill    lisinopril (ZESTRIL) 10 mg tablet Take 1 tablet (10 mg total) by mouth daily 90 tablet 3     No current facility-administered medications for this visit       No Known Allergies   Immunizations:     Immunization History   Administered Date(s) Administered    COVID-19 PFIZER VACCINE 0 3 ML IM 02/15/2021, 03/08/2021, 10/16/2021    INFLUENZA 10/12/2018, 10/10/2019, 11/04/2021    Influenza Split High Dose Preservative Free IM 10/12/2018, 10/10/2019    Influenza, high dose seasonal 0 7 mL 10/07/2020    Tdap 03/09/2017    Zoster 12/22/2014      Health Maintenance:         Topic Date Due    Colorectal Cancer Screening  02/21/2023    Hepatitis C Screening  Addressed         Topic Date Due    Pneumococcal Vaccine: 65+ Years (1 of 1 - PPSV23) Never done      Medicare Health Risk Assessment:     /80 (BP Location: Left arm, Patient Position: Sitting, Cuff Size: Standard)   Pulse 62   Temp (!) 95 6 °F (35 3 °C)   Resp 16   Ht 6' (1 829 m)   Wt 68 kg (150 lb)   BMI 20 34 kg/m²      Axel Rowland is here for his Subsequent Wellness visit  Last Medicare Wellness visit information reviewed, patient interviewed and updates made to the record today  Health Risk Assessment:   Patient rates overall health as very good  Patient feels that their physical health rating is same  Patient is very satisfied with their life  Eyesight was rated as same  Hearing was rated as same  Patient feels that their emotional and mental health rating is same  Patients states they are never, rarely angry  Patient states they are never, rarely unusually tired/fatigued  Pain experienced in the last 7 days has been some  Patient's pain rating has been 4/10  Patient states that he has experienced no weight loss or gain in last 6 months  Depression Screening:   PHQ-2 Score: 0      Fall Risk Screening: In the past year, patient has experienced: history of falling in past year      Home Safety:  Patient does not have trouble with stairs inside or outside of their home  Patient has working smoke alarms and has no working carbon monoxide detector  Home safety hazards include: none  Nutrition:   Current diet is No Added Salt and Limited junk food  Medications:   Patient is not currently taking any over-the-counter supplements  Patient is able to manage medications  Activities of Daily Living (ADLs)/Instrumental Activities of Daily Living (IADLs):   Walk and transfer into and out of bed and chair?: Yes  Dress and groom yourself?: Yes    Bathe or shower yourself?: Yes    Feed yourself?  Yes  Do your laundry/housekeeping?: Yes  Manage your money, pay your bills and track your expenses?: Yes  Make your own meals?: Yes    Do your own shopping?: Yes    Previous Hospitalizations:   Any hospitalizations or ED visits within the last 12 months?: No      Advance Care Planning:   Living will: No    Durable POA for healthcare: No    Advanced directive: No      Cognitive Screening:   Provider or family/friend/caregiver concerned regarding cognition?: No    PREVENTIVE SCREENINGS      Cardiovascular Screening:    General: Screening Current      Diabetes Screening:     General: Screening Current    Due for: Blood Glucose      Colorectal Cancer Screening:     General: Screening Current    Due for: Colonoscopy - Low Risk      Prostate Cancer Screening:    General: Screening Current      Osteoporosis Screening:    General: Screening Not Indicated      Abdominal Aortic Aneurysm (AAA) Screening:    Risk factors include: family history of AAA and age between 73-67 yo        General: Screening Current      Lung Cancer Screening:     General: Screening Not Indicated      Hepatitis C Screening:    General: Screening Current    Screening, Brief Intervention, and Referral to Treatment (SBIRT)    Screening  Typical number of drinks in a day: 0  Typical number of drinks in a week: 1  Interpretation: Low risk drinking behavior  AUDIT-C Screenin) How often did you have a drink containing alcohol in the past year? monthly or less  2) How many drinks did you have on a typical day when you were drinking in the past year? 1 to 2  3) How often did you have 6 or more drinks on one occasion in the past year? never    AUDIT-C Score: 1  Interpretation: Score 0-3 (male): Negative screen for alcohol misuse    Single Item Drug Screening:  How often have you used an illegal drug (including marijuana) or a prescription medication for non-medical reasons in the past year? never    Single Item Drug Screen Score: 0  Interpretation: Negative screen for possible drug use disorder    Brief Intervention  Alcohol & drug use screenings were reviewed  No concerns regarding substance use disorder identified  Other Counseling Topics:   Regular weightbearing exercise and calcium and vitamin D intake         Elaina Watson MD

## 2022-04-13 NOTE — PATIENT INSTRUCTIONS
Medicare Preventive Visit Patient Instructions  Thank you for completing your Welcome to Medicare Visit or Medicare Annual Wellness Visit today  Your next wellness visit will be due in one year (4/14/2023)  The screening/preventive services that you may require over the next 5-10 years are detailed below  Some tests may not apply to you based off risk factors and/or age  Screening tests ordered at today's visit but not completed yet may show as past due  Also, please note that scanned in results may not display below  Preventive Screenings:  Service Recommendations Previous Testing/Comments   Colorectal Cancer Screening  · Colonoscopy    · Fecal Occult Blood Test (FOBT)/Fecal Immunochemical Test (FIT)  · Fecal DNA/Cologuard Test  · Flexible Sigmoidoscopy Age: 54-65 years old   Colonoscopy: every 10 years (May be performed more frequently if at higher risk)  OR  FOBT/FIT: every 1 year  OR  Cologuard: every 3 years  OR  Sigmoidoscopy: every 5 years  Screening may be recommended earlier than age 48 if at higher risk for colorectal cancer  Also, an individualized decision between you and your healthcare provider will decide whether screening between the ages of 74-80 would be appropriate   Colonoscopy: 03/16/2017  FOBT/FIT: Not on file  Cologuard: Not on file  Sigmoidoscopy: Not on file    Screening Current     Prostate Cancer Screening Individualized decision between patient and health care provider in men between ages of 53-78   Medicare will cover every 12 months beginning on the day after your 50th birthday PSA: 2 4 ng/mL     Screening Current     Hepatitis C Screening Once for adults born between 1945 and 1965  More frequently in patients at high risk for Hepatitis C Hep C Antibody: Not on file    Screening Current   Diabetes Screening 1-2 times per year if you're at risk for diabetes or have pre-diabetes Fasting glucose: 87 mg/dL   A1C: No results in last 5 years    Screening Current   Cholesterol Screening Once every 5 years if you don't have a lipid disorder  May order more often based on risk factors  Lipid panel: 08/17/2021    Screening Current      Other Preventive Screenings Covered by Medicare:  1  Abdominal Aortic Aneurysm (AAA) Screening: covered once if your at risk  You're considered to be at risk if you have a family history of AAA or a male between the age of 73-68 who smoking at least 100 cigarettes in your lifetime  2  Lung Cancer Screening: covers low dose CT scan once per year if you meet all of the following conditions: (1) Age 50-69; (2) No signs or symptoms of lung cancer; (3) Current smoker or have quit smoking within the last 15 years; (4) You have a tobacco smoking history of at least 30 pack years (packs per day x number of years you smoked); (5) You get a written order from a healthcare provider  3  Glaucoma Screening: covered annually if you're considered high risk: (1) You have diabetes OR (2) Family history of glaucoma OR (3)  aged 48 and older OR (3)  American aged 72 and older  3  Osteoporosis Screening: covered every 2 years if you meet one of the following conditions: (1) Have a vertebral abnormality; (2) On glucocorticoid therapy for more than 3 months; (3) Have primary hyperparathyroidism; (4) On osteoporosis medications and need to assess response to drug therapy  5  HIV Screening: covered annually if you're between the age of 12-76  Also covered annually if you are younger than 13 and older than 72 with risk factors for HIV infection  For pregnant patients, it is covered up to 3 times per pregnancy      Immunizations:  Immunization Recommendations   Influenza Vaccine Annual influenza vaccination during flu season is recommended for all persons aged >= 6 months who do not have contraindications   Pneumococcal Vaccine (Prevnar and Pneumovax)  * Prevnar = PCV13  * Pneumovax = PPSV23 Adults 25-60 years old: 1-3 doses may be recommended based on certain risk factors  Adults 72 years old: Prevnar (PCV13) vaccine recommended followed by Pneumovax (PPSV23) vaccine  If already received PPSV23 since turning 65, then PCV13 recommended at least one year after PPSV23 dose  Hepatitis B Vaccine 3 dose series if at intermediate or high risk (ex: diabetes, end stage renal disease, liver disease)   Tetanus (Td) Vaccine - COST NOT COVERED BY MEDICARE PART B Following completion of primary series, a booster dose should be given every 10 years to maintain immunity against tetanus  Td may also be given as tetanus wound prophylaxis  Tdap Vaccine - COST NOT COVERED BY MEDICARE PART B Recommended at least once for all adults  For pregnant patients, recommended with each pregnancy  Shingles Vaccine (Shingrix) - COST NOT COVERED BY MEDICARE PART B  2 shot series recommended in those aged 48 and above     Health Maintenance Due:      Topic Date Due    Colorectal Cancer Screening  02/21/2023    Hepatitis C Screening  Addressed     Immunizations Due:      Topic Date Due    Pneumococcal Vaccine: 65+ Years (1 of 1 - PPSV23) Never done     Advance Directives   What are advance directives? Advance directives are legal documents that state your wishes and plans for medical care  These plans are made ahead of time in case you lose your ability to make decisions for yourself  Advance directives can apply to any medical decision, such as the treatments you want, and if you want to donate organs  What are the types of advance directives? There are many types of advance directives, and each state has rules about how to use them  You may choose a combination of any of the following:  · Living will: This is a written record of the treatment you want  You can also choose which treatments you do not want, which to limit, and which to stop at a certain time  This includes surgery, medicine, IV fluid, and tube feedings  · Durable power of  for healthcare [facility]):   This is a written record that states who you want to make healthcare choices for you when you are unable to make them for yourself  This person, called a proxy, is usually a family member or a friend  You may choose more than 1 proxy  · Do not resuscitate (DNR) order:  A DNR order is used in case your heart stops beating or you stop breathing  It is a request not to have certain forms of treatment, such as CPR  A DNR order may be included in other types of advance directives  · Medical directive: This covers the care that you want if you are in a coma, near death, or unable to make decisions for yourself  You can list the treatments you want for each condition  Treatment may include pain medicine, surgery, blood transfusions, dialysis, IV or tube feedings, and a ventilator (breathing machine)  · Values history: This document has questions about your views, beliefs, and how you feel and think about life  This information can help others choose the care that you would choose  Why are advance directives important? An advance directive helps you control your care  Although spoken wishes may be used, it is better to have your wishes written down  Spoken wishes can be misunderstood, or not followed  Treatments may be given even if you do not want them  An advance directive may make it easier for your family to make difficult choices about your care  Fall Prevention    Fall prevention  includes ways to make your home and other areas safer  It also includes ways you can move more carefully to prevent a fall  Health conditions that cause changes in your blood pressure, vision, or muscle strength and coordination may increase your risk for falls  Medicines may also increase your risk for falls if they make you dizzy, weak, or sleepy  Fall prevention tips:   · Stand or sit up slowly  · Use assistive devices as directed  · Wear shoes that fit well and have soles that   · Wear a personal alarm  · Stay active  · Manage your medical conditions  Home Safety Tips:  · Add items to prevent falls in the bathroom  · Keep paths clear  · Install bright lights in your home  · Keep items you use often on shelves within reach  · Paint or place reflective tape on the edges of your stairs  © Copyright [employer] 2018 Information is for End User's use only and may not be sold, redistributed or otherwise used for commercial purposes   All illustrations and images included in CareNotes® are the copyrighted property of A D A M , [employer]  or [address] [place] [address] [facility]

## 2022-08-23 ENCOUNTER — TELEPHONE (OUTPATIENT)
Dept: PAIN MEDICINE | Facility: CLINIC | Age: 72
End: 2022-08-23

## 2022-08-23 NOTE — TELEPHONE ENCOUNTER
Spoke with patient, will look elsewhere for a sooner appt  Than March of next year  Dr Marinelli Sensor is moving out of network

## 2022-08-25 ENCOUNTER — APPOINTMENT (OUTPATIENT)
Dept: LAB | Facility: CLINIC | Age: 72
End: 2022-08-25
Payer: MEDICARE

## 2022-08-25 DIAGNOSIS — Z12.5 SCREENING FOR PROSTATE CANCER: ICD-10-CM

## 2022-08-25 LAB
ALBUMIN SERPL BCP-MCNC: 3.8 G/DL (ref 3.5–5)
ALP SERPL-CCNC: 63 U/L (ref 46–116)
ALT SERPL W P-5'-P-CCNC: 24 U/L (ref 12–78)
ANION GAP SERPL CALCULATED.3IONS-SCNC: 5 MMOL/L (ref 4–13)
AST SERPL W P-5'-P-CCNC: 12 U/L (ref 5–45)
BASOPHILS # BLD AUTO: 0.04 THOUSANDS/ΜL (ref 0–0.1)
BASOPHILS NFR BLD AUTO: 1 % (ref 0–1)
BILIRUB SERPL-MCNC: 1.17 MG/DL (ref 0.2–1)
BUN SERPL-MCNC: 17 MG/DL (ref 5–25)
CALCIUM SERPL-MCNC: 9 MG/DL (ref 8.3–10.1)
CHLORIDE SERPL-SCNC: 105 MMOL/L (ref 96–108)
CO2 SERPL-SCNC: 29 MMOL/L (ref 21–32)
CREAT SERPL-MCNC: 0.99 MG/DL (ref 0.6–1.3)
EOSINOPHIL # BLD AUTO: 0.07 THOUSAND/ΜL (ref 0–0.61)
EOSINOPHIL NFR BLD AUTO: 2 % (ref 0–6)
ERYTHROCYTE [DISTWIDTH] IN BLOOD BY AUTOMATED COUNT: 13 % (ref 11.6–15.1)
GFR SERPL CREATININE-BSD FRML MDRD: 75 ML/MIN/1.73SQ M
GLUCOSE P FAST SERPL-MCNC: 96 MG/DL (ref 65–99)
HCT VFR BLD AUTO: 42.9 % (ref 36.5–49.3)
HGB BLD-MCNC: 14.2 G/DL (ref 12–17)
IMM GRANULOCYTES # BLD AUTO: 0.01 THOUSAND/UL (ref 0–0.2)
IMM GRANULOCYTES NFR BLD AUTO: 0 % (ref 0–2)
LYMPHOCYTES # BLD AUTO: 1.54 THOUSANDS/ΜL (ref 0.6–4.47)
LYMPHOCYTES NFR BLD AUTO: 43 % (ref 14–44)
MCH RBC QN AUTO: 30.3 PG (ref 26.8–34.3)
MCHC RBC AUTO-ENTMCNC: 33.1 G/DL (ref 31.4–37.4)
MCV RBC AUTO: 92 FL (ref 82–98)
MONOCYTES # BLD AUTO: 0.42 THOUSAND/ΜL (ref 0.17–1.22)
MONOCYTES NFR BLD AUTO: 12 % (ref 4–12)
NEUTROPHILS # BLD AUTO: 1.51 THOUSANDS/ΜL (ref 1.85–7.62)
NEUTS SEG NFR BLD AUTO: 42 % (ref 43–75)
NRBC BLD AUTO-RTO: 0 /100 WBCS
PLATELET # BLD AUTO: 191 THOUSANDS/UL (ref 149–390)
PMV BLD AUTO: 11 FL (ref 8.9–12.7)
POTASSIUM SERPL-SCNC: 3.9 MMOL/L (ref 3.5–5.3)
PROT SERPL-MCNC: 7 G/DL (ref 6.4–8.4)
PSA SERPL-MCNC: 2.5 NG/ML (ref 0–4)
RBC # BLD AUTO: 4.69 MILLION/UL (ref 3.88–5.62)
SODIUM SERPL-SCNC: 139 MMOL/L (ref 135–147)
TSH SERPL DL<=0.05 MIU/L-ACNC: 2.72 UIU/ML (ref 0.45–4.5)
WBC # BLD AUTO: 3.59 THOUSAND/UL (ref 4.31–10.16)

## 2022-08-25 PROCEDURE — G0103 PSA SCREENING: HCPCS

## 2022-08-26 DIAGNOSIS — R26.9 GAIT DISTURBANCE: ICD-10-CM

## 2022-08-26 DIAGNOSIS — D72.819 LEUKOPENIA, UNSPECIFIED TYPE: Primary | ICD-10-CM

## 2022-08-26 DIAGNOSIS — E04.1 THYROID NODULE: ICD-10-CM

## 2022-08-26 DIAGNOSIS — I10 ESSENTIAL HYPERTENSION: ICD-10-CM

## 2022-08-30 ENCOUNTER — APPOINTMENT (OUTPATIENT)
Dept: LAB | Facility: CLINIC | Age: 72
End: 2022-08-30
Payer: MEDICARE

## 2022-08-30 DIAGNOSIS — D72.819 LEUKOPENIA, UNSPECIFIED TYPE: ICD-10-CM

## 2022-08-30 LAB
FOLATE SERPL-MCNC: >20 NG/ML (ref 3.1–17.5)
VIT B12 SERPL-MCNC: 406 PG/ML (ref 100–900)

## 2022-08-30 PROCEDURE — 82607 VITAMIN B-12: CPT | Performed by: FAMILY MEDICINE

## 2022-08-30 PROCEDURE — 82746 ASSAY OF FOLIC ACID SERUM: CPT | Performed by: FAMILY MEDICINE

## 2022-08-30 PROCEDURE — 83918 ORGANIC ACIDS TOTAL QUANT: CPT

## 2022-08-30 PROCEDURE — 36415 COLL VENOUS BLD VENIPUNCTURE: CPT | Performed by: FAMILY MEDICINE

## 2022-08-30 PROCEDURE — 84165 PROTEIN E-PHORESIS SERUM: CPT | Performed by: FAMILY MEDICINE

## 2022-08-31 LAB
ALBUMIN SERPL ELPH-MCNC: 4.2 G/DL (ref 3.5–5)
ALBUMIN SERPL ELPH-MCNC: 64.6 % (ref 52–65)
ALPHA1 GLOB SERPL ELPH-MCNC: 0.22 G/DL (ref 0.1–0.4)
ALPHA1 GLOB SERPL ELPH-MCNC: 3.4 % (ref 2.5–5)
ALPHA2 GLOB SERPL ELPH-MCNC: 0.46 G/DL (ref 0.4–1.2)
ALPHA2 GLOB SERPL ELPH-MCNC: 7.1 % (ref 7–13)
BETA GLOB ABNORMAL SERPL ELPH-MCNC: 0.31 G/DL (ref 0.4–0.8)
BETA1 GLOB SERPL ELPH-MCNC: 4.7 % (ref 5–13)
BETA2 GLOB SERPL ELPH-MCNC: 4.4 % (ref 2–8)
BETA2+GAMMA GLOB SERPL ELPH-MCNC: 0.29 G/DL (ref 0.2–0.5)
GAMMA GLOB ABNORMAL SERPL ELPH-MCNC: 1.03 G/DL (ref 0.5–1.6)
GAMMA GLOB SERPL ELPH-MCNC: 15.8 % (ref 12–22)
IGG/ALB SER: 1.82 {RATIO} (ref 1.1–1.8)
PROT PATTERN SERPL ELPH-IMP: ABNORMAL
PROT SERPL-MCNC: 6.5 G/DL (ref 6.4–8.2)

## 2022-08-31 PROCEDURE — 84165 PROTEIN E-PHORESIS SERUM: CPT | Performed by: PATHOLOGY

## 2022-09-02 LAB — METHYLMALONATE SERPL-SCNC: 141 NMOL/L (ref 0–378)

## 2022-09-07 DIAGNOSIS — M19.042 PRIMARY OSTEOARTHRITIS OF LEFT HAND: ICD-10-CM

## 2022-09-07 DIAGNOSIS — D72.819 LEUKOPENIA, UNSPECIFIED TYPE: Primary | ICD-10-CM

## 2022-09-07 DIAGNOSIS — M19.041 PRIMARY OSTEOARTHRITIS OF RIGHT HAND: ICD-10-CM

## 2022-09-08 ENCOUNTER — TELEPHONE (OUTPATIENT)
Dept: FAMILY MEDICINE CLINIC | Facility: CLINIC | Age: 72
End: 2022-09-08

## 2022-09-08 NOTE — TELEPHONE ENCOUNTER
Per your [employer] message yesterday, Pt will wait 3 months to have the labs drawn  Once those results are received he will decide on seeing rheumatology  Is this appropriate? Please advise

## 2022-09-23 ENCOUNTER — OFFICE VISIT (OUTPATIENT)
Dept: FAMILY MEDICINE CLINIC | Facility: CLINIC | Age: 72
End: 2022-09-23
Payer: MEDICARE

## 2022-09-23 VITALS
SYSTOLIC BLOOD PRESSURE: 144 MMHG | BODY MASS INDEX: 19.5 KG/M2 | HEART RATE: 78 BPM | RESPIRATION RATE: 17 BRPM | OXYGEN SATURATION: 97 % | HEIGHT: 72 IN | WEIGHT: 144 LBS | TEMPERATURE: 95.7 F | DIASTOLIC BLOOD PRESSURE: 82 MMHG

## 2022-09-23 DIAGNOSIS — R00.0 RACING HEART BEAT: Primary | ICD-10-CM

## 2022-09-23 DIAGNOSIS — R35.1 NOCTURIA: ICD-10-CM

## 2022-09-23 PROCEDURE — 93000 ELECTROCARDIOGRAM COMPLETE: CPT | Performed by: FAMILY MEDICINE

## 2022-09-23 PROCEDURE — 99214 OFFICE O/P EST MOD 30 MIN: CPT | Performed by: FAMILY MEDICINE

## 2022-09-23 NOTE — PROGRESS NOTES
FAMILY MEDICINE PROGRESS NOTE    Date of Service: 22  Primary Care Provider:   Jayna Rodarte MD       Name: Negar Rico       : 1950       Age:72 y o  Sex: male      MRN: [id number]      Chief Complaint:Palpitations       ASSESSMENT and PLAN:  Negar Rico is a 67 y o  male with:     Problem List Items Addressed This Visit    None     Visit Diagnoses     Racing heart beat    -  Primary    Relevant Orders    POCT ECG (Completed)    Nocturia        Relevant Orders    Urinalysis with microscopic        Patient with episodes of racing heart beat, no palpitations, no other associated symptoms  Only other thing that occurs is urinary frequency worse from baseline at night unclear if there is any relation  He has not daytime symptoms, no cardiac symptoms with exercise  EKG done the office is normal  Recent labs studies normal  Believe there might be component of dehydration, sounds like he consumes less than 30oz of fluids daily  Encouraged increase hydration  Given infrequency of evens, holter monitor unlikely to be useful in this case  Will check UA given urinary frequency, though discussed possible anxiety component when he is up worried about racing heart  SUBJECTIVE:  Negar Rico is a 67 y o  male who presents today with a chief complaint of Palpitations  HPI     He reports his first episode occurred several months ago with nocturia with 5 to 6 episodes of needing to void associated with racing heart  He said the same thing occurred over labor  These two episodes occurred after eating a piece of cake  Then the last two nights he had episode where he feels like his heart is racing, worse when he lays in the left side  He states that last night he felt his heart racing, this started after he got into bed  He does not feel palpitations  He denies having other symptoms such as chest pain, shortness of breath, lightheadedness, dizziness   He does watch the news right before bed      He is very active walks about 4 to 5 miles a day 3 to 4 days a week  He denies symptoms with activity  He only drinks about 16 ounces of water a day  He will also have 3 cups of tea  Review of Systems   Respiratory: Negative for shortness of breath  Cardiovascular: Negative for chest pain, palpitations and leg swelling  Genitourinary: Positive for frequency  Nocturia    Neurological: Negative for dizziness, light-headedness and headaches  I have reviewed the patient's Past Medical History  Current Outpatient Medications:     lisinopril (ZESTRIL) 10 mg tablet, Take 1 tablet (10 mg total) by mouth daily, Disp: 90 tablet, Rfl: 3    OBJECTIVE:  /82   Pulse 78   Temp (!) 95 7 °F (35 4 °C)   Resp 17   Ht 6' (1 829 m)   Wt 65 3 kg (144 lb)   SpO2 97%   BMI 19 53 kg/m²    BP Readings from Last 3 Encounters:   09/23/22 144/82   04/13/22 132/80   11/04/21 140/80      Wt Readings from Last 3 Encounters:   09/23/22 65 3 kg (144 lb)   04/13/22 68 kg (150 lb)   11/04/21 67 1 kg (148 lb)      Physical Exam  Constitutional:       General: He is not in acute distress  Appearance: Normal appearance  He is not ill-appearing, toxic-appearing or diaphoretic  HENT:      Head: Normocephalic and atraumatic  Right Ear: External ear normal       Left Ear: External ear normal    Eyes:      Extraocular Movements: Extraocular movements intact  Conjunctiva/sclera: Conjunctivae normal    Cardiovascular:      Rate and Rhythm: Normal rate and regular rhythm  Pulses: Normal pulses  Heart sounds: Normal heart sounds  No murmur heard  No friction rub  Pulmonary:      Effort: Pulmonary effort is normal  No respiratory distress  Breath sounds: Normal breath sounds  No stridor  No wheezing, rhonchi or rales  Musculoskeletal:      Cervical back: Normal range of motion and neck supple  No rigidity  Right lower leg: No edema  Left lower leg: No edema     Skin: Findings: No erythema or rash  Neurological:      General: No focal deficit present  Mental Status: He is alert and oriented to person, place, and time  Psychiatric:         Mood and Affect: Mood normal          Behavior: Behavior normal          Lab Results   Component Value Date    WBC 3 59 (L) 08/25/2022    HGB 14 2 08/25/2022    HCT 42 9 08/25/2022    MCV 92 08/25/2022     08/25/2022       Lab Results   Component Value Date    [id number] 2 720 08/25/2022     Lab Results   Component Value Date    SODIUM 139 08/25/2022    K 3 9 08/25/2022     08/25/2022    CO2 29 08/25/2022    BUN 17 08/25/2022    CREATININE 0 99 08/25/2022    GLUC 96 03/13/2017    CALCIUM 9 0 08/25/2022     Lab Results   Component Value Date    ALT 24 08/25/2022    AST 12 08/25/2022    ALKPHOS 63 08/25/2022    BILITOT 1 21 (H) 04/10/2014              Return if symptoms worsen or fail to improve  Nichol Pak MD    Note: Portions of the record have been created with voice recognition software  Occasional wrong word or "sound a like" substitutions may have occurred due to the inherent limitations of voice recognition software  Read the chart carefully and recognize, using context, where substitutions have occurred

## 2022-09-23 NOTE — PATIENT INSTRUCTIONS
Increase hydration during the day-aim to get 64 ounces a day of fluid and water is best  Try to avoid fluid within two to three hours of bedtime    If symptoms start happening more often (I e  weekly or more than once a week) we can obtain a Holter monitor

## 2022-12-07 ENCOUNTER — APPOINTMENT (OUTPATIENT)
Dept: LAB | Facility: CLINIC | Age: 72
End: 2022-12-07

## 2022-12-07 DIAGNOSIS — D72.819 LEUKOPENIA, UNSPECIFIED TYPE: ICD-10-CM

## 2022-12-07 LAB
ANA SER QL IA: NEGATIVE
BACTERIA UR QL AUTO: NORMAL /HPF
BASOPHILS # BLD AUTO: 0.03 THOUSANDS/ÂΜL (ref 0–0.1)
BASOPHILS NFR BLD AUTO: 1 % (ref 0–1)
BILIRUB UR QL STRIP: NEGATIVE
CLARITY UR: NORMAL
COLOR UR: YELLOW
EOSINOPHIL # BLD AUTO: 0.07 THOUSAND/ÂΜL (ref 0–0.61)
EOSINOPHIL NFR BLD AUTO: 2 % (ref 0–6)
ERYTHROCYTE [DISTWIDTH] IN BLOOD BY AUTOMATED COUNT: 12.4 % (ref 11.6–15.1)
GLUCOSE UR STRIP-MCNC: NEGATIVE MG/DL
HCT VFR BLD AUTO: 43.9 % (ref 36.5–49.3)
HGB BLD-MCNC: 14.3 G/DL (ref 12–17)
HGB UR QL STRIP.AUTO: NEGATIVE
IMM GRANULOCYTES # BLD AUTO: 0.01 THOUSAND/UL (ref 0–0.2)
IMM GRANULOCYTES NFR BLD AUTO: 0 % (ref 0–2)
KETONES UR STRIP-MCNC: NEGATIVE MG/DL
LEUKOCYTE ESTERASE UR QL STRIP: NEGATIVE
LYMPHOCYTES # BLD AUTO: 1.02 THOUSANDS/ÂΜL (ref 0.6–4.47)
LYMPHOCYTES NFR BLD AUTO: 27 % (ref 14–44)
MCH RBC QN AUTO: 29.7 PG (ref 26.8–34.3)
MCHC RBC AUTO-ENTMCNC: 32.6 G/DL (ref 31.4–37.4)
MCV RBC AUTO: 91 FL (ref 82–98)
MONOCYTES # BLD AUTO: 0.46 THOUSAND/ÂΜL (ref 0.17–1.22)
MONOCYTES NFR BLD AUTO: 12 % (ref 4–12)
NEUTROPHILS # BLD AUTO: 2.24 THOUSANDS/ÂΜL (ref 1.85–7.62)
NEUTS SEG NFR BLD AUTO: 58 % (ref 43–75)
NITRITE UR QL STRIP: NEGATIVE
NON-SQ EPI CELLS URNS QL MICRO: NORMAL /HPF
NRBC BLD AUTO-RTO: 0 /100 WBCS
PH UR STRIP.AUTO: 5.5 [PH]
PLATELET # BLD AUTO: 230 THOUSANDS/UL (ref 149–390)
PMV BLD AUTO: 11.2 FL (ref 8.9–12.7)
PROT UR STRIP-MCNC: NEGATIVE MG/DL
RBC # BLD AUTO: 4.81 MILLION/UL (ref 3.88–5.62)
RBC #/AREA URNS AUTO: NORMAL /HPF
SP GR UR STRIP.AUTO: 1.02 (ref 1–1.03)
UROBILINOGEN UR STRIP-ACNC: <2 MG/DL
WBC # BLD AUTO: 3.83 THOUSAND/UL (ref 4.31–10.16)
WBC #/AREA URNS AUTO: NORMAL /HPF

## 2022-12-07 PROCEDURE — 85025 COMPLETE CBC W/AUTO DIFF WBC: CPT | Performed by: FAMILY MEDICINE

## 2022-12-08 LAB — RHEUMATOID FACT SER QL LA: NEGATIVE

## 2022-12-11 DIAGNOSIS — D72.819 LEUKOPENIA, UNSPECIFIED TYPE: Primary | ICD-10-CM

## 2023-01-30 DIAGNOSIS — I10 ESSENTIAL HYPERTENSION: ICD-10-CM

## 2023-01-30 RX ORDER — LISINOPRIL 10 MG/1
10 TABLET ORAL DAILY
Qty: 90 TABLET | Refills: 3 | Status: SHIPPED | OUTPATIENT
Start: 2023-01-30

## 2023-07-11 ENCOUNTER — OFFICE VISIT (OUTPATIENT)
Dept: RHEUMATOLOGY | Facility: CLINIC | Age: 73
End: 2023-07-11
Payer: MEDICARE

## 2023-07-11 VITALS
WEIGHT: 144.6 LBS | DIASTOLIC BLOOD PRESSURE: 70 MMHG | SYSTOLIC BLOOD PRESSURE: 128 MMHG | HEIGHT: 72 IN | BODY MASS INDEX: 19.59 KG/M2

## 2023-07-11 DIAGNOSIS — M19.049 HAND ARTHRITIS: Primary | ICD-10-CM

## 2023-07-11 DIAGNOSIS — M04.9 AUTOINFLAMMATORY SYNDROME, UNSPECIFIED (HCC): ICD-10-CM

## 2023-07-11 PROCEDURE — 99204 OFFICE O/P NEW MOD 45 MIN: CPT | Performed by: INTERNAL MEDICINE

## 2023-07-11 RX ORDER — SULFASALAZINE 500 MG/1
500 TABLET ORAL 2 TIMES DAILY
Qty: 180 TABLET | Refills: 2 | Status: SHIPPED | OUTPATIENT
Start: 2023-07-11

## 2023-07-11 NOTE — PROGRESS NOTES
Rheumatology Consult   Anton Rutherfordvcak 75 y.o. male 1950    DATE: 7/11/2023    Reason for Consult: hand arthritis    Assessment and Plan:  Bilateral hand arthritis with worsening right 4th PIP involvement and progressively decreased ROM  Begin sulfasalazine 500mg BID to prevent progression of hand arthritis and preserve function  Refer to [address] [place] [address] for evaluation and discussion of possible surgical options. Anti-inflammatory diet/exercise/weight control  Increase CV fitness/aerobic activity  Patient to continue to monitor symptoms  Topical NSAIDs/analgesics PRN joint pain  Continue to monitor labs including CRP, CBC, CMP every 8-12 weeks. F/u in 3-4 mos. or sooner if necessary    History of Present Illness:  Reina Mackenzie is a 75 y.o. male Here for initial eval.of bilateral hand arthritis. Worsening pain and progressively decreased ROM affecting his PIP joints primarily. He is very active and exercises his hands daily. Pat. GM with OA. Xrays reviewed. Review of Systems  Review of Systems   Constitutional: Negative for fatigue. HENT: Negative for mouth sores. Respiratory: Negative for cough and shortness of breath. Cardiovascular: Negative for chest pain and leg swelling. Gastrointestinal: Negative for abdominal pain, constipation and diarrhea. Musculoskeletal: Positive for arthralgias. Negative for back pain, joint swelling and myalgias. Skin: Negative for color change and rash. Neurological: Negative for weakness. Hematological: Negative for adenopathy. Psychiatric/Behavioral: Negative for sleep disturbance.        Allergies  No Known Allergies    Current Medications      Past Medical History  Past Medical History:   Diagnosis Date   • Agent orange exposure     while serving in [place]   • Basal cell carcinoma    • Diverticulosis    • Dysplastic nevus    • Hemorrhoids    • Inguinal hernia     last assessed 01/25/2013   • Nephrolithiasis     resolved 03/03/2014 Past Surgical History  Past Surgical History:   Procedure Laterality Date   • INGUINAL HERNIA REPAIR Right    • MALIGNANT SKIN LESION EXCISION      face and trunk       Family History  No known autoimmune or inflammatory diseases in the family. Family History   Problem Relation Age of Onset   • Macular degeneration Mother    • Aneurysm Father    • Coronary artery disease Father    • Diabetes type II Father        Social History  Occupation: retired [employer]  Social History     Substance and Sexual Activity   Alcohol Use Yes    Comment: rare     Social History     Substance and Sexual Activity   Drug Use No     Social History     Tobacco Use   Smoking Status Never   Smokeless Tobacco Never        Objective:  /70   Ht 6' (1.829 m)   Wt 65.6 kg (144 lb 9.6 oz)   BMI 19.61 kg/m²     Physical Exam  Constitutional:       General: He is not in acute distress. HENT:      Head: Normocephalic and atraumatic. Eyes:      Conjunctiva/sclera: Conjunctivae normal.   Cardiovascular:      Rate and Rhythm: Normal rate and regular rhythm. Heart sounds: S1 normal and S2 normal.      No friction rub. Pulmonary:      Effort: Pulmonary effort is normal. No respiratory distress. Breath sounds: Normal breath sounds. No wheezing, rhonchi or rales. Musculoskeletal:      Right hand: Deformity and tenderness present. Decreased range of motion. Left hand: Deformity and tenderness present. Decreased range of motion. Cervical back: Neck supple. Skin:     General: Skin is warm and dry. Coloration: Skin is not pale. Findings: No rash. Neurological:      Mental Status: He is alert. Mental status is at baseline. Psychiatric:         Mood and Affect: Mood normal.         Behavior: Behavior normal.            Lab Results: I have personally reviewed pertinent reports.       CBC:   , Chemistry Profile:       Invalid input(s): "ALBUMIN", Coagulation Studies:   , Cardiac Studies:   , Additional Labs:   , iSTAT CHEM 8:       Invalid input(s): "POTASSIUMIS", ABG:   , Toxicology:   , Last A1C/Lipid Panel/Thyroid Panel:   Lab Results   Component Value Date    TRIG 31 08/17/2021    TRIG 38 03/26/2019    CHOL 186 02/25/2015    CHOL 168 04/10/2014    HDL 77 08/17/2021    HDL 77 (H) 03/26/2019    LDLCALC 67 08/17/2021    LDLCALC 84 03/26/2019    [id number] 2.720 08/25/2022     Lab Results   Component Value Date    MATTHIAS Negative 12/07/2022    RF Negative 12/07/2022           Invalid input(s): "Raoul Potter"         Imaging: I have personally reviewed pertinent films in PACS

## 2023-07-11 NOTE — PATIENT INSTRUCTIONS
Use over the counter Voltaren gel and apply it to your painful joints up to 4 times per day. Take the sulfasalazine as prescribed. This will help prevent the progression of your hand arthritis. Please have your blood work done 4 weeks after beginning the sulfasalazine.

## 2023-08-28 ENCOUNTER — APPOINTMENT (OUTPATIENT)
Dept: LAB | Facility: CLINIC | Age: 73
End: 2023-08-28
Payer: MEDICARE

## 2023-08-28 DIAGNOSIS — Z12.5 SCREENING FOR PROSTATE CANCER: ICD-10-CM

## 2023-08-28 LAB
BASOPHILS # BLD AUTO: 0.02 THOUSANDS/ÂΜL (ref 0–0.1)
BASOPHILS NFR BLD AUTO: 1 % (ref 0–1)
EOSINOPHIL # BLD AUTO: 0.02 THOUSAND/ÂΜL (ref 0–0.61)
EOSINOPHIL NFR BLD AUTO: 1 % (ref 0–6)
ERYTHROCYTE [DISTWIDTH] IN BLOOD BY AUTOMATED COUNT: 13.2 % (ref 11.6–15.1)
HCT VFR BLD AUTO: 44.6 % (ref 36.5–49.3)
HGB BLD-MCNC: 15.1 G/DL (ref 12–17)
IMM GRANULOCYTES # BLD AUTO: 0.02 THOUSAND/UL (ref 0–0.2)
IMM GRANULOCYTES NFR BLD AUTO: 1 % (ref 0–2)
LYMPHOCYTES # BLD AUTO: 1.39 THOUSANDS/ÂΜL (ref 0.6–4.47)
LYMPHOCYTES NFR BLD AUTO: 34 % (ref 14–44)
MCH RBC QN AUTO: 30.5 PG (ref 26.8–34.3)
MCHC RBC AUTO-ENTMCNC: 33.9 G/DL (ref 31.4–37.4)
MCV RBC AUTO: 90 FL (ref 82–98)
MONOCYTES # BLD AUTO: 0.46 THOUSAND/ÂΜL (ref 0.17–1.22)
MONOCYTES NFR BLD AUTO: 11 % (ref 4–12)
NEUTROPHILS # BLD AUTO: 2.15 THOUSANDS/ÂΜL (ref 1.85–7.62)
NEUTS SEG NFR BLD AUTO: 52 % (ref 43–75)
NRBC BLD AUTO-RTO: 0 /100 WBCS
PLATELET # BLD AUTO: 205 THOUSANDS/UL (ref 149–390)
PMV BLD AUTO: 11.1 FL (ref 8.9–12.7)
PSA SERPL-MCNC: 2.67 NG/ML (ref 0–4)
RBC # BLD AUTO: 4.95 MILLION/UL (ref 3.88–5.62)
WBC # BLD AUTO: 4.06 THOUSAND/UL (ref 4.31–10.16)

## 2023-08-28 PROCEDURE — 36415 COLL VENOUS BLD VENIPUNCTURE: CPT

## 2023-08-28 PROCEDURE — G0103 PSA SCREENING: HCPCS

## 2023-08-28 PROCEDURE — 85025 COMPLETE CBC W/AUTO DIFF WBC: CPT | Performed by: FAMILY MEDICINE

## 2023-09-15 ENCOUNTER — TELEPHONE (OUTPATIENT)
Age: 73
End: 2023-09-15

## 2023-09-15 ENCOUNTER — NURSE TRIAGE (OUTPATIENT)
Age: 73
End: 2023-09-15

## 2023-09-15 NOTE — TELEPHONE ENCOUNTER
Patient called and stated he is currently away, has been checking blood pressure. Yesterday's reading was 190/115, today's reading was 160/107. He felt lightheaded yesterday, better today. Patient was warm transferred to clinical to be triaged.

## 2023-09-15 NOTE — TELEPHONE ENCOUNTER
Patient called in to report elevated BP readings yesterday and today; lightheaded 9/14 resolved; no symptoms today. Increased intake of processed foods, chips past few days. Still taking 2 mile walks. Taking Lisinopril 10 mg daily. Patient is out of state and ER precautions given for the weekend. OV scheduled with PCP for 9/18 at 2:20 PM.     Patient wants to know if provider wants him to take extra dose of Lisinopril.  Please follow up with patient for provider advice

## 2023-09-15 NOTE — TELEPHONE ENCOUNTER
Patient called in to report elevated BP readings yesterday and today; lightheaded 9/14 resolved; no symptoms today. Increased intake of processed foods, chips past few days. Still taking 2 mile walks. Taking Lisinopril 10 mg daily. Patien tis out of state and ER precautions given for the weekend. OV scheduled with PCP for 9/18 at 2:20 PM. Patient wants to know if provider wants him to take extra dose of Lisinopril. Reason for Disposition  • Systolic BP >= 635 OR Diastolic >= 022, and missed most recent dose of blood pressure medication    Answer Assessment - Initial Assessment Questions  1. BLOOD PRESSURE: "What is the blood pressure?" "Did you take at least two measurements 5 minutes apart?"      9/14 BP= 190/105; 190/115 9//107  2. ONSET: "When did you take your blood pressure?"      9/14/22  3. HOW: "How did you obtain the blood pressure?" (e.g., visiting nurse, automatic home BP monitor)      Friend Home BP monitor  4. HISTORY: "Do you have a history of high blood pressure?"      Yes, controlled with Lisinopril 10 mg daily  5.  MEDICATIONS: "Are you taking any medications for blood pressure?" "Have you missed any doses recently?"      *No Answer*  6. OTHER SYMPTOMS: "Do you have any symptoms?" (e.g., headache, chest pain, blurred vision, difficulty breathing, weakness)     Lightheaded yesterday; walked a couple miles 9/14 AM    Protocols used: BLOOD PRESSURE - HIGH-ADULT-OH

## 2023-09-18 ENCOUNTER — OFFICE VISIT (OUTPATIENT)
Dept: FAMILY MEDICINE CLINIC | Facility: CLINIC | Age: 73
End: 2023-09-18
Payer: MEDICARE

## 2023-09-18 VITALS
BODY MASS INDEX: 19.71 KG/M2 | HEART RATE: 57 BPM | SYSTOLIC BLOOD PRESSURE: 160 MMHG | HEIGHT: 72 IN | WEIGHT: 145.5 LBS | OXYGEN SATURATION: 99 % | DIASTOLIC BLOOD PRESSURE: 90 MMHG | TEMPERATURE: 96.9 F

## 2023-09-18 DIAGNOSIS — E04.1 THYROID NODULE: ICD-10-CM

## 2023-09-18 DIAGNOSIS — M19.042 PRIMARY OSTEOARTHRITIS OF BOTH HANDS: ICD-10-CM

## 2023-09-18 DIAGNOSIS — I10 PRIMARY HYPERTENSION: Primary | ICD-10-CM

## 2023-09-18 DIAGNOSIS — M19.041 PRIMARY OSTEOARTHRITIS OF BOTH HANDS: ICD-10-CM

## 2023-09-18 DIAGNOSIS — D72.819 LEUKOPENIA, UNSPECIFIED TYPE: ICD-10-CM

## 2023-09-18 PROCEDURE — 99214 OFFICE O/P EST MOD 30 MIN: CPT | Performed by: FAMILY MEDICINE

## 2023-09-18 NOTE — PROGRESS NOTES
Name: Danyelle Damian      : 1950      MRN: [id number]  Encounter Provider: Aliya Dsouza MD  Encounter Date: 2023   Encounter department: [address] [place] [address]     1. Primary hypertension    2. Leukopenia, unspecified type    3. Primary osteoarthritis of both hands    4. Thyroid nodule    Increase Lisinopril to 20 mg daily. Monitor BP at home. Advised to call if any changes. Follow up with Rheumatology        Subjective     Follow-up visit for elevated BP. History of hypertension previously stable on Lisinopril 10 mg daily. He has had a number of elevated readings over the last several weeks. He was on vacation-not eating his normal diet high salt foods. No OTCs such as NSAIDs or decongestants. Otherwise asymptomatic. 2023 creatinine 0.98. GFR 75. Electrolytes normal.  Hemoglobin 15.1.     Recent Results (from the past 672 hour(s))   CBC and differential    Collection Time: 23  7:27 AM   Result Value Ref Range    WBC 4.06 (L) 4.31 - 10.16 Thousand/uL    RBC 4.95 3.88 - 5.62 Million/uL    Hemoglobin 15.1 12.0 - 17.0 g/dL    Hematocrit 44.6 36.5 - 49.3 %    MCV 90 82 - 98 fL    MCH 30.5 26.8 - 34.3 pg    MCHC 33.9 31.4 - 37.4 g/dL    RDW 13.2 11.6 - 15.1 %    MPV 11.1 8.9 - 12.7 fL    Platelets 349 854 - 817 Thousands/uL    nRBC 0 /100 WBCs    Neutrophils Relative 52 43 - 75 %    Immat GRANS % 1 0 - 2 %    Lymphocytes Relative 34 14 - 44 %    Monocytes Relative 11 4 - 12 %    Eosinophils Relative 1 0 - 6 %    Basophils Relative 1 0 - 1 %    Neutrophils Absolute 2.15 1.85 - 7.62 Thousands/µL    Immature Grans Absolute 0.02 0.00 - 0.20 Thousand/uL    Lymphocytes Absolute 1.39 0.60 - 4.47 Thousands/µL    Monocytes Absolute 0.46 0.17 - 1.22 Thousand/µL    Eosinophils Absolute 0.02 0.00 - 0.61 Thousand/µL    Basophils Absolute 0.02 0.00 - 0.10 Thousands/µL   Comprehensive metabolic panel    Collection Time: 23  7:27 AM   Result Value Ref Range    Sodium 139 135 - 147 mmol/L    Potassium 4.2 3.5 - 5.3 mmol/L    Chloride 104 96 - 108 mmol/L    CO2 29 21 - 32 mmol/L    ANION GAP 6 mmol/L    BUN 15 5 - 25 mg/dL    Creatinine 0.98 0.60 - 1.30 mg/dL    Glucose, Fasting 92 65 - 99 mg/dL    Calcium 9.3 8.4 - 10.2 mg/dL    AST 18 13 - 39 U/L    ALT 15 7 - 52 U/L    Alkaline Phosphatase 61 34 - 104 U/L    Total Protein 6.7 6.4 - 8.4 g/dL    Albumin 4.2 3.5 - 5.0 g/dL    Total Bilirubin 1.34 (H) 0.20 - 1.00 mg/dL    eGFR 75 ml/min/1.73sq m   PSA, Total Screen    Collection Time: 08/28/23  7:27 AM   Result Value Ref Range    PSA 2.67 0.00 - 4.00 ng/mL         Review of Systems   Constitutional: Negative for appetite change, chills, fever and unexpected weight change. 10 pound weight loss from 4/2023   HENT: Negative for trouble swallowing and voice change. History of thyroid nodules. 10/2022 benign-appearing right thyroid nodules. No significant change from 102/21   No new or suspicious thyroid nodules. . History of radiation for acne as a teenager. Respiratory: Negative for cough, shortness of breath and wheezing. Cardiovascular: Negative for chest pain, palpitations and leg swelling. See HPI.  11/2021 abd aorta u/s no AAA       Gastrointestinal: Negative for abdominal pain, blood in stool, constipation, diarrhea, nausea and vomiting.         03/2017 colonoscopy. Endocrine: Negative for polydipsia and polyuria. See ENT ROS. Lab Results       Component                Value               Date                       [id number]             3.540               08/17/2021               Genitourinary: Positive for difficulty urinating.         Nocturia 1-3x         Component                Value               Date                       PSA                      2.67                08/28/2023                 PSA                      2.5                 08/25/2022                 PSA                      2.4                 08/17/2021 Musculoskeletal: Positive for arthralgias. Negative for myalgias. OA hands. 12/2022  RF and MATTHIAS negative. 10/2022 Hand x rays mild, moderate and moderate severe osteoarthritis both hands. Periarticular soft tissue swelling adjacent to multiple involved joints. 7/2023 rheumatology evaluation patient was prescribed sulfasalazine which she opted not to start due to concerns about side effects. Skin: Negative for rash. Allergic/Immunologic: Negative for environmental allergies. Neurological: Negative for dizziness and headaches. Hematological: Negative for adenopathy. Does not bruise/bleed easily. History of mild stable leukopenia. 08/2022 MMA and SPEP normal. Agent Orange exposure.      Lab Results       Component                Value               Date                       WBC                      4.06 (L)            08/28/2023                 HGB                      15.1                08/28/2023                 HCT                      44.6                08/28/2023                 MCV                      90                  08/28/2023                 PLT                      205                 08/28/2023                    WBC       Date                     Value               Ref Range           Status                08/28/2023               4.06 (L)            4.31 - 10.16 T*     Final                 12/07/2022               3.83 (L)            4.31 - 10.16 T*     Final                 08/25/2022               3.59 (L)            4.31 - 10.16 T*     Final                 04/10/2014               4.42                4.31 - 10.16 T*     Final                        Lab Results       Component                Value               Date                       [id number]               406                 08/30/2022              Lab Results       Component                Value               Date                       FOLATE                   >20.0 (H)           08/30/2022 Psychiatric/Behavioral: Negative for dysphoric mood and sleep disturbance. Past Medical History:   Diagnosis Date   • Agent orange exposure     while serving in [place]   • Basal cell carcinoma    • Diverticulosis    • Dysplastic nevus    • Hemorrhoids    • Inguinal hernia     last assessed 01/25/2013   • Nephrolithiasis     resolved 03/03/2014     Past Surgical History:   Procedure Laterality Date   • INGUINAL HERNIA REPAIR Right    • MALIGNANT SKIN LESION EXCISION      face and trunk     Family History   Problem Relation Age of Onset   • Macular degeneration Mother    • Aneurysm Father    • Coronary artery disease Father    • Diabetes type II Father      Social History     Socioeconomic History   • Marital status: /Civil Union     Spouse name: None   • Number of children: None   • Years of education: None   • Highest education level: None   Occupational History   • None   Tobacco Use   • Smoking status: Never   • Smokeless tobacco: Never   Substance and Sexual Activity   • Alcohol use: Yes     Comment: rare   • Drug use: No   • Sexual activity: None   Other Topics Concern   • None   Social History Narrative   • None     Social Determinants of Health     Financial Resource Strain: Low Risk  (4/16/2023)    Overall Financial Resource Strain (CARDIA)    • Difficulty of Paying Living Expenses: Not hard at all   Food Insecurity: Not on file   Transportation Needs: No Transportation Needs (4/16/2023)    PRAPARE - Transportation    • Lack of Transportation (Medical): No    • Lack of Transportation (Non-Medical):  No   Physical Activity: Not on file   Stress: Not on file   Social Connections: Not on file   Intimate Partner Violence: Not on file   Housing Stability: Not on file     Current Outpatient Medications on File Prior to Visit   Medication Sig   • lisinopril (ZESTRIL) 10 mg tablet Take 1 tablet (10 mg total) by mouth daily   • sulfaSALAzine (AZULFIDINE) 500 mg tablet Take 1 tablet (500 mg total) by mouth 2 (two) times a day (Patient not taking: Reported on 9/18/2023)     No Known Allergies  Immunization History   Administered Date(s) Administered   • COVID-19 PFIZER VACCINE 0.3 ML IM 02/15/2021, 03/08/2021, 10/16/2021   • COVID-19 Pfizer Vac BIVALENT Norberto-sucrose 12 Yr+ IM 09/30/2022   • INFLUENZA 10/12/2018, 10/10/2019, 11/04/2021, 10/25/2022   • Influenza Split High Dose Preservative Free IM 10/12/2018, 10/10/2019   • Influenza, high dose seasonal 0.7 mL 10/07/2020   • Tdap 03/09/2017   • Zoster 12/22/2014       Objective     /90 (BP Location: Left arm, Patient Position: Sitting, Cuff Size: Standard)   Pulse 57   Temp (!) 96.9 °F (36.1 °C)   Ht 6' (1.829 m)   Wt 66 kg (145 lb 8 oz)   SpO2 99%   BMI 19.73 kg/m²        BP Readings from Last 3 Encounters:   09/18/23 160/90   07/11/23 128/70   04/17/23 140/88     Wt Readings from Last 3 Encounters:   09/18/23 66 kg (145 lb 8 oz)   07/11/23 65.6 kg (144 lb 9.6 oz)   04/17/23 70.3 kg (155 lb)       Physical Exam  Vitals and nursing note reviewed. Constitutional:       General: He is not in acute distress. Appearance: He is well-developed. HENT:      Right Ear: Tympanic membrane normal.      Left Ear: Tympanic membrane normal.   Eyes:      General: No scleral icterus. Extraocular Movements: Extraocular movements intact. Conjunctiva/sclera: Conjunctivae normal.      Pupils: Pupils are equal, round, and reactive to light. Neck:      Thyroid: No thyroid mass or thyromegaly. Vascular: No carotid bruit or JVD. Trachea: No tracheal deviation. Cardiovascular:      Rate and Rhythm: Normal rate and regular rhythm. Pulses:           Carotid pulses are 2+ on the right side and 2+ on the left side. Heart sounds: Normal heart sounds. No murmur heard. No gallop. Pulmonary:      Effort: Pulmonary effort is normal. No respiratory distress. Breath sounds: Normal breath sounds. No wheezing or rales.    Abdominal: General: Bowel sounds are normal. There is no distension or abdominal bruit. Palpations: Abdomen is soft. There is no hepatomegaly, splenomegaly or mass. Tenderness: There is no abdominal tenderness. There is no guarding or rebound. Musculoskeletal:         General: Deformity present. Right lower leg: No edema. Left lower leg: No edema. Comments: Heberden's and Lars's nodes of both hands   Lymphadenopathy:      Cervical: No cervical adenopathy. Upper Body:      Right upper body: No supraclavicular adenopathy. Left upper body: No supraclavicular adenopathy. Skin:     Findings: No rash. Nails: There is no clubbing. Neurological:      General: No focal deficit present. Mental Status: He is alert and oriented to person, place, and time.    Psychiatric:         Mood and Affect: Mood normal.       Mariola Galarza MD

## 2023-11-20 ENCOUNTER — HOSPITAL ENCOUNTER (OUTPATIENT)
Dept: RADIOLOGY | Age: 73
Discharge: HOME | End: 2023-11-20
Payer: MEDICARE

## 2023-11-20 DIAGNOSIS — E04.1 THYROID NODULE: ICD-10-CM

## 2023-11-20 PROCEDURE — 76536 US EXAM OF HEAD AND NECK: CPT

## 2023-11-29 NOTE — TELEPHONE ENCOUNTER
Please advise PRINCIPAL DISCHARGE DIAGNOSIS  Diagnosis: CVA (cerebrovascular accident)  Assessment and Plan of Treatment: Take plavix for 10 more days, continue aspirin. Follow up with neurology. Take lipitor as well. You should also follow up with vascular surgery regarding ICA stenosis.      SECONDARY DISCHARGE DIAGNOSES  Diagnosis: COPD with acute exacerbation  Assessment and Plan of Treatment: Take prednisone taper and doxycycline. Follow up with Dr. Mckenna. Get repeat CT chest in 3 months.

## 2024-01-12 DIAGNOSIS — I10 ESSENTIAL HYPERTENSION: ICD-10-CM

## 2024-01-12 RX ORDER — LISINOPRIL 10 MG/1
10 TABLET ORAL DAILY
Qty: 90 TABLET | Refills: 1 | Status: SHIPPED | OUTPATIENT
Start: 2024-01-12

## 2024-01-26 ENCOUNTER — NURSE TRIAGE (OUTPATIENT)
Age: 74
End: 2024-01-26

## 2024-01-26 DIAGNOSIS — J01.00 ACUTE NON-RECURRENT MAXILLARY SINUSITIS: Primary | ICD-10-CM

## 2024-01-26 RX ORDER — AMOXICILLIN 500 MG/1
500 CAPSULE ORAL 3 TIMES DAILY
Qty: 21 CAPSULE | Refills: 0 | Status: SHIPPED | OUTPATIENT
Start: 2024-01-26 | End: 2024-02-02

## 2024-01-26 NOTE — TELEPHONE ENCOUNTER
"Reason for Disposition   Sinus congestion (pressure, fullness) present > 10 days    Answer Assessment - Initial Assessment Questions  1. LOCATION: \"Where does it hurt?\"       Facial pain and cheeks pain   2. ONSET: \"When did the sinus pain start?\"  (e.g., hours, days)       Last week  3. SEVERITY: \"How bad is the pain?\"   (Scale 1-10; mild, moderate or severe)    - MILD (1-3): doesn't interfere with normal activities     - MODERATE (4-7): interferes with normal activities (e.g., work or school) or awakens from sleep    - SEVERE (8-10): excruciating pain and patient unable to do any normal activities         6  4. RECURRENT SYMPTOM: \"Have you ever had sinus problems before?\" If Yes, ask: \"When was the last time?\" and \"What happened that time?\"       Yes was given abx   5. NASAL CONGESTION: \"Is the nose blocked?\" If Yes, ask: \"Can you open it or must you breathe through the mouth?\"      yes  6. NASAL DISCHARGE: \"Do you have discharge from your nose?\" If so ask, \"What color?\"      Yes ,yellowish   7. FEVER: \"Do you have a fever?\" If Yes, ask: \"What is it, how was it measured, and when did it start?\"       No   8. OTHER SYMPTOMS: \"Do you have any other symptoms?\" (e.g., sore throat, cough, earache, difficulty breathing)      Sore throat   Been using OTC coricidin hbp  symptoms not improving   Patient declined an appt  Patient would like an abx.called into the pharmacy    Protocols used: Sinus Pain or Congestion-ADULT-[place]    "

## 2024-04-11 ENCOUNTER — RA CDI HCC (OUTPATIENT)
Dept: OTHER | Facility: HOSPITAL | Age: 74
End: 2024-04-11

## 2024-04-17 ENCOUNTER — OFFICE VISIT (OUTPATIENT)
Dept: FAMILY MEDICINE CLINIC | Facility: CLINIC | Age: 74
End: 2024-04-17
Payer: MEDICARE

## 2024-04-17 VITALS
HEART RATE: 65 BPM | WEIGHT: 151 LBS | DIASTOLIC BLOOD PRESSURE: 80 MMHG | OXYGEN SATURATION: 100 % | SYSTOLIC BLOOD PRESSURE: 132 MMHG | TEMPERATURE: 98 F | RESPIRATION RATE: 16 BRPM | HEIGHT: 72 IN | BODY MASS INDEX: 20.45 KG/M2

## 2024-04-17 DIAGNOSIS — Z00.00 WELL ADULT EXAM: Primary | ICD-10-CM

## 2024-04-17 DIAGNOSIS — I10 ESSENTIAL HYPERTENSION: ICD-10-CM

## 2024-04-17 DIAGNOSIS — Z12.5 SCREENING FOR PROSTATE CANCER: ICD-10-CM

## 2024-04-17 PROCEDURE — G0439 PPPS, SUBSEQ VISIT: HCPCS | Performed by: FAMILY MEDICINE

## 2024-04-17 RX ORDER — LISINOPRIL 10 MG/1
10 TABLET ORAL DAILY
Qty: 90 TABLET | Refills: 3 | Status: SHIPPED | OUTPATIENT
Start: 2024-04-17

## 2024-04-17 NOTE — PROGRESS NOTES
Assessment and Plan:     Problem List Items Addressed This Visit    None  Visit Diagnoses       Well adult exam    -  Primary    Essential hypertension        Relevant Medications    lisinopril (ZESTRIL) 10 mg tablet    Other Relevant Orders    Comprehensive metabolic panel    CBC and differential    Screening for prostate cancer        Relevant Orders    PSA, Total Screen          Continue with current medication. Monitor BP. Repeat labs. I discussed pros/cons re PSA testing.  08/2024    ES Tylenol PRN for OA hands    OV 1 year       Depression Screening and Follow-up Plan: Patient was screened for depression during today's encounter. They screened negative with a PHQ-2 score of 0.      Preventive health issues were discussed with patient, and age appropriate screening tests were ordered as noted in patient's After Visit Summary.  Personalized health advice and appropriate referrals for health education or preventive services given if needed, as noted in patient's After Visit Summary.     History of Present Illness:     Patient presents for a Medicare Wellness Visit    AWV     Hypertension blood pressures have been stable on Lisinopril 10 mg/day  8/2023 creatinine 0.98.  GFR 75.  Electrolytes normal.  Hemoglobin 15.1.         Recent Results (from the past 7560 hour(s))   CBC and differential    Collection Time: 08/28/23  7:27 AM   Result Value Ref Range    WBC 4.06 (L) 4.31 - 10.16 Thousand/uL    RBC 4.95 3.88 - 5.62 Million/uL    Hemoglobin 15.1 12.0 - 17.0 g/dL    Hematocrit 44.6 36.5 - 49.3 %    MCV 90 82 - 98 fL    MCH 30.5 26.8 - 34.3 pg    MCHC 33.9 31.4 - 37.4 g/dL    RDW 13.2 11.6 - 15.1 %    MPV 11.1 8.9 - 12.7 fL    Platelets 205 149 - 390 Thousands/uL    nRBC 0 /100 WBCs    Segmented % 52 43 - 75 %    Immature Grans % 1 0 - 2 %    Lymphocytes % 34 14 - 44 %    Monocytes % 11 4 - 12 %    Eosinophils Relative 1 0 - 6 %    Basophils Relative 1 0 - 1 %    Absolute Neutrophils 2.15 1.85 - 7.62 Thousands/µL     Absolute Immature Grans 0.02 0.00 - 0.20 Thousand/uL    Absolute Lymphocytes 1.39 0.60 - 4.47 Thousands/µL    Absolute Monocytes 0.46 0.17 - 1.22 Thousand/µL    Eosinophils Absolute 0.02 0.00 - 0.61 Thousand/µL    Basophils Absolute 0.02 0.00 - 0.10 Thousands/µL   Comprehensive metabolic panel    Collection Time: 08/28/23  7:27 AM   Result Value Ref Range    Sodium 139 135 - 147 mmol/L    Potassium 4.2 3.5 - 5.3 mmol/L    Chloride 104 96 - 108 mmol/L    CO2 29 21 - 32 mmol/L    ANION GAP 6 mmol/L    BUN 15 5 - 25 mg/dL    Creatinine 0.98 0.60 - 1.30 mg/dL    Glucose, Fasting 92 65 - 99 mg/dL    Calcium 9.3 8.4 - 10.2 mg/dL    AST 18 13 - 39 U/L    ALT 15 7 - 52 U/L    Alkaline Phosphatase 61 34 - 104 U/L    Total Protein 6.7 6.4 - 8.4 g/dL    Albumin 4.2 3.5 - 5.0 g/dL    Total Bilirubin 1.34 (H) 0.20 - 1.00 mg/dL    eGFR 75 ml/min/1.73sq m   PSA, Total Screen    Collection Time: 08/28/23  7:27 AM   Result Value Ref Range    PSA 2.67 0.00 - 4.00 ng/mL           Patient Care Team:  Melo Madrid MD as PCP - General  Joshua Mendosa MD (Dermatology)     Review of Systems:     Review of Systems   Constitutional:  Positive for unexpected weight change (7 lb weight gain from 07/2023). Negative for appetite change.   HENT:  Negative for trouble swallowing and voice change.         History of thyroid nodules. 11/2023 2 thyroid nodules. No nodule meets current ACR criteria for requiring biopsy or followup ultrasounds. . History of radiation for acne as a teenager.    Respiratory:  Negative for cough, shortness of breath and wheezing.    Cardiovascular:  Negative for chest pain, palpitations and leg swelling.        See HPI.  11/2021 abd aorta u/s no AAA       Gastrointestinal:  Negative for abdominal pain, blood in stool, constipation, diarrhea, nausea and vomiting.         03/2017 colonoscopy.    Endocrine: Negative for polydipsia and polyuria.        See ENT ROS.      Lab Results       Component                Value                Date                       [id number]             3.540               08/17/2021               Genitourinary:  Negative for difficulty urinating.        Nocturia 1-3x         Component                Value               Date                       PSA                      2.67                08/28/2023                 PSA                      2.5                 08/25/2022                 PSA                      2.4                 08/17/2021                            Musculoskeletal:  Positive for arthralgias. Negative for myalgias.        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 10/2022 Hand x rays mild, moderate and moderate severe osteoarthritis both hands. Periarticular soft tissue swelling adjacent to multiple involved joints.7/2023 Rheumatology evaluation patient was prescribed sulfasalazine which she opted not to start due to concerns about side effects.   Skin:  Negative for rash.   Allergic/Immunologic: Negative for environmental allergies.   Neurological:  Negative for dizziness and headaches.   Hematological:  Negative for adenopathy. Does not bruise/bleed easily.        History of mild stable leukopenia. 08/2022 MMA and SPEP normal. Agent Orange exposure.     Lab Results       Component                Value               Date                       WBC                      4.06 (L)            08/28/2023                 HGB                      15.1                08/28/2023                 HCT                      44.6                08/28/2023                 MCV                      90                  08/28/2023                 PLT                      205                 08/28/2023                    WBC       Date                     Value               Ref Range           Status                08/28/2023               4.06 (L)            4.31 - 10.16 T*     Final                 12/07/2022               3.83 (L)            4.31 - 10.16 T*     Final                 08/25/2022                3.59 (L)            4.31 - 10.16 T*     Final                 04/10/2014               4.42                4.31 - 10.16 T*     Final                        Lab Results       Component                Value               Date                       [id number]               406                 08/30/2022              Lab Results       Component                Value               Date                       FOLATE                   >20.0 (H)           08/30/2022               Psychiatric/Behavioral:  Negative for dysphoric mood and sleep disturbance.         Problem List:     Patient Active Problem List   Diagnosis    Hypertension    Thyroid nodule    Leukopenia    Primary osteoarthritis of both hands      Past Medical and Surgical History:     Past Medical History:   Diagnosis Date    Agent orange exposure     while serving in [employer]    Basal cell carcinoma     Diverticulosis     Dysplastic nevus     Hemorrhoids     Inguinal hernia     last assessed 01/25/2013    Nephrolithiasis     resolved 03/03/2014     Past Surgical History:   Procedure Laterality Date    INGUINAL HERNIA REPAIR Right     MALIGNANT SKIN LESION EXCISION      face and trunk      Family History:     Family History   Problem Relation Age of Onset    Macular degeneration Mother     Aneurysm Father     Coronary artery disease Father     Diabetes type II Father       Social History:     Social History     Socioeconomic History    Marital status: /Civil Union     Spouse name: None    Number of children: None    Years of education: None    Highest education level: None   Occupational History    None   Tobacco Use    Smoking status: Never    Smokeless tobacco: Never   Vaping Use    Vaping status: Never Used   Substance and Sexual Activity    Alcohol use: Yes     Comment: rare    Drug use: No    Sexual activity: Not Currently   Other Topics Concern    None   Social History Narrative    None     Social Determinants of Health     Financial Resource  Strain: Low Risk  (4/16/2023)    Overall Financial Resource Strain (CARDIA)     Difficulty of Paying Living Expenses: Not hard at all   Food Insecurity: Not on file   Transportation Needs: No Transportation Needs (4/16/2023)    PRAPARE - Transportation     Lack of Transportation (Medical): No     Lack of Transportation (Non-Medical): No   Physical Activity: Not on file   Stress: Not on file   Social Connections: Not on file   Intimate Partner Violence: Not on file   Housing Stability: Not on file      Medications and Allergies:     Current Outpatient Medications   Medication Sig Dispense Refill    lisinopril (ZESTRIL) 10 mg tablet Take 1 tablet (10 mg total) by mouth daily 90 tablet 3     No current facility-administered medications for this visit.     No Known Allergies   Immunizations:     Immunization History   Administered Date(s) Administered    COVID-19 Moderna mRNA Vaccine 12 Yr+ 50 mcg/0.5 mL (Spikevax) 10/06/2023    COVID-19 PFIZER VACCINE 0.3 ML IM 02/15/2021, 03/08/2021, 10/16/2021    COVID-19 Pfizer Vac BIVALENT Norberto-sucrose 12 Yr+ IM 09/30/2022    INFLUENZA 10/12/2018, 10/10/2019, 11/04/2021, 10/25/2022    Influenza Split High Dose Preservative Free IM 10/12/2018, 10/10/2019    Influenza, high dose seasonal 0.7 mL 10/07/2020    Tdap 03/09/2017    Zoster 12/22/2014      Health Maintenance:         Topic Date Due    Colorectal Cancer Screening  04/21/2027    Hepatitis C Screening  Addressed         Topic Date Due    Influenza Vaccine (1) 09/01/2023    COVID-19 Vaccine (6 - 2023-24 season) 12/01/2023      Medicare Screening Tests and Risk Assessments:     Benji is here for his Subsequent Wellness visit. Last Medicare Wellness visit information reviewed, patient interviewed and updates made to the record today.      Health Risk Assessment:   Patient rates overall health as very good. Patient feels that their physical health rating is same. Patient is very satisfied with their life. Eyesight was rated as  same. Hearing was rated as slightly worse. Patient feels that their emotional and mental health rating is same. Patients states they are never, rarely angry. Patient states they are never, rarely unusually tired/fatigued. Pain experienced in the last 7 days has been some. Patient's pain rating has been 5/10. Patient states that he has experienced no weight loss or gain in last 6 months.     Depression Screening:   PHQ-2 Score: 0      Fall Risk Screening:   In the past year, patient has experienced: no history of falling in past year      Home Safety:  Patient does not have trouble with stairs inside or outside of their home. Patient has working smoke alarms and has no working carbon monoxide detector. Home safety hazards include: none.     Nutrition:   Current diet is Regular and No Added Salt.     Medications:   Patient is not currently taking any over-the-counter supplements. Patient is able to manage medications.     Activities of Daily Living (ADLs)/Instrumental Activities of Daily Living (IADLs):   Walk and transfer into and out of bed and chair?: Yes  Dress and groom yourself?: Yes    Bathe or shower yourself?: Yes    Feed yourself? Yes  Do your laundry/housekeeping?: Yes  Manage your money, pay your bills and track your expenses?: Yes  Make your own meals?: Yes    Do your own shopping?: Yes    Previous Hospitalizations:   Any hospitalizations or ED visits within the last 12 months?: No      Advance Care Planning:   Living will: No      PREVENTIVE SCREENINGS      Cardiovascular Screening:    General: Screening Current      Diabetes Screening:     General: Screening Current      Colorectal Cancer Screening:     General: Screening Current      Prostate Cancer Screening:    General: Screening Current      Abdominal Aortic Aneurysm (AAA) Screening:    Risk factors include: family history of AAA and age between 65-76 yo        Lung Cancer Screening:     General: Screening Not Indicated      Hepatitis C Screening:     General: Screening Current    Screening, Brief Intervention, and Referral to Treatment (SBIRT)    Screening  Typical number of drinks in a day: 0  Typical number of drinks in a week: 1  Interpretation: Low risk drinking behavior.    Single Item Drug Screening:  How often have you used an illegal drug (including marijuana) or a prescription medication for non-medical reasons in the past year? never    Single Item Drug Screen Score: 0  Interpretation: Negative screen for possible drug use disorder    No results found.     Physical Exam:     /80 (BP Location: Left arm, Patient Position: Sitting, Cuff Size: Standard)   Pulse 65   Temp 98 °F (36.7 °C)   Resp 16   Ht 6' (1.829 m)   Wt 68.5 kg (151 lb)   SpO2 100%   BMI 20.48 kg/m²     BP Readings from Last 3 Encounters:   04/17/24 132/80   09/18/23 160/90   07/11/23 128/70      Wt Readings from Last 3 Encounters:   04/17/24 68.5 kg (151 lb)   09/18/23 66 kg (145 lb 8 oz)   07/11/23 65.6 kg (144 lb 9.6 oz)           Physical Exam  Constitutional:       General: He is not in acute distress.  HENT:      Right Ear: Tympanic membrane and ear canal normal.      Left Ear: Tympanic membrane and ear canal normal.   Eyes:      General: No scleral icterus.     Extraocular Movements: Extraocular movements intact.      Conjunctiva/sclera: Conjunctivae normal.      Pupils: Pupils are equal, round, and reactive to light.   Neck:      Vascular: No carotid bruit.   Cardiovascular:      Rate and Rhythm: Normal rate and regular rhythm.      Heart sounds: No murmur heard.     No gallop.   Pulmonary:      Effort: Pulmonary effort is normal.      Breath sounds: Normal breath sounds. No wheezing or rales.   Abdominal:      General: Bowel sounds are normal. There is no distension.      Palpations: Abdomen is soft. There is no hepatomegaly, splenomegaly or mass.      Tenderness: There is no abdominal tenderness. There is no guarding or rebound.   Musculoskeletal:      Right hand:  Deformity present. No swelling.      Left hand: Deformity present. No swelling.      Right lower leg: No edema.      Left lower leg: No edema.      Comments: Heberden's/Lars's nodes hands   Lymphadenopathy:      Cervical: No cervical adenopathy.   Skin:     Findings: No rash.   Neurological:      General: No focal deficit present.      Mental Status: He is alert and oriented to person, place, and time.   Psychiatric:         Mood and Affect: Mood normal.         Cognition and Memory: Cognition normal.          Melo Madrid MD

## 2024-04-17 NOTE — PATIENT INSTRUCTIONS
Medicare Preventive Visit Patient Instructions  Thank you for completing your Welcome to Medicare Visit or Medicare Annual Wellness Visit today. Your next wellness visit will be due in one year (4/18/2025).  The screening/preventive services that you may require over the next 5-10 years are detailed below. Some tests may not apply to you based off risk factors and/or age. Screening tests ordered at today's visit but not completed yet may show as past due. Also, please note that scanned in results may not display below.  Preventive Screenings:  Service Recommendations Previous Testing/Comments   Colorectal Cancer Screening  Colonoscopy    Fecal Occult Blood Test (FOBT)/Fecal Immunochemical Test (FIT)  Fecal DNA/Cologuard Test  Flexible Sigmoidoscopy Age: 45-75 years old   Colonoscopy: every 10 years (May be performed more frequently if at higher risk)  OR  FOBT/FIT: every 1 year  OR  Cologuard: every 3 years  OR  Sigmoidoscopy: every 5 years  Screening may be recommended earlier than age 45 if at higher risk for colorectal cancer. Also, an individualized decision between you and your healthcare provider will decide whether screening between the ages of 76-85 would be appropriate. Colonoscopy: 04/21/2022  FOBT/FIT: Not on file  Cologuard: Not on file  Sigmoidoscopy: Not on file    Screening Current     Prostate Cancer Screening Individualized decision between patient and health care provider in men between ages of 55-69   Medicare will cover every 12 months beginning on the day after your 50th birthday PSA: 2.67 ng/mL     Screening Current     Hepatitis C Screening Once for adults born between 1945 and 1965  More frequently in patients at high risk for Hepatitis C Hep C Antibody: Not on file    Screening Current   Diabetes Screening 1-2 times per year if you're at risk for diabetes or have pre-diabetes Fasting glucose: 92 mg/dL (8/28/2023)  A1C: No results in last 5 years (No results in last 5 years)  Screening  Current   Cholesterol Screening Once every 5 years if you don't have a lipid disorder. May order more often based on risk factors. Lipid panel: 08/17/2021  Screening Current      Other Preventive Screenings Covered by Medicare:  Abdominal Aortic Aneurysm (AAA) Screening: covered once if your at risk. You're considered to be at risk if you have a family history of AAA or a male between the age of 65-75 who smoking at least 100 cigarettes in your lifetime.  Lung Cancer Screening: covers low dose CT scan once per year if you meet all of the following conditions: (1) Age 55-77; (2) No signs or symptoms of lung cancer; (3) Current smoker or have quit smoking within the last 15 years; (4) You have a tobacco smoking history of at least 20 pack years (packs per day x number of years you smoked); (5) You get a written order from a healthcare provider.  Glaucoma Screening: covered annually if you're considered high risk: (1) You have diabetes OR (2) Family history of glaucoma OR (3)  aged 50 and older OR (4)  American aged 65 and older  Osteoporosis Screening: covered every 2 years if you meet one of the following conditions: (1) Have a vertebral abnormality; (2) On glucocorticoid therapy for more than 3 months; (3) Have primary hyperparathyroidism; (4) On osteoporosis medications and need to assess response to drug therapy.  HIV Screening: covered annually if you're between the age of 15-65. Also covered annually if you are younger than 15 and older than 65 with risk factors for HIV infection. For pregnant patients, it is covered up to 3 times per pregnancy.    Immunizations:  Immunization Recommendations   Influenza Vaccine Annual influenza vaccination during flu season is recommended for all persons aged >= 6 months who do not have contraindications   Pneumococcal Vaccine   * Pneumococcal conjugate vaccine = PCV13 (Prevnar 13), PCV15 (Vaxneuvance), PCV20 (Prevnar 20)  * Pneumococcal polysaccharide  vaccine = PPSV23 (Pneumovax) Adults 19-65 yo with certain risk factors or if 65+ yo  If never received any pneumonia vaccine: recommend Prevnar 20 (PCV20)  Give PCV20 if previously received 1 dose of PCV13 or PPSV23   Hepatitis B Vaccine 3 dose series if at intermediate or high risk (ex: diabetes, end stage renal disease, liver disease)   Respiratory syncytial virus (RSV) Vaccine - COVERED BY MEDICARE PART D  * RSVPreF3 (Arexvy) CDC recommends that adults 60 years of age and older may receive a single dose of RSV vaccine using shared clinical decision-making (SCDM)   Tetanus (Td) Vaccine - COST NOT COVERED BY MEDICARE PART B Following completion of primary series, a booster dose should be given every 10 years to maintain immunity against tetanus. Td may also be given as tetanus wound prophylaxis.   Tdap Vaccine - COST NOT COVERED BY MEDICARE PART B Recommended at least once for all adults. For pregnant patients, recommended with each pregnancy.   Shingles Vaccine (Shingrix) - COST NOT COVERED BY MEDICARE PART B  2 shot series recommended in those 19 years and older who have or will have weakened immune systems or those 50 years and older     Health Maintenance Due:      Topic Date Due   • Colorectal Cancer Screening  04/21/2027   • Hepatitis C Screening  Addressed     Immunizations Due:      Topic Date Due   • Influenza Vaccine (1) 09/01/2023   • COVID-19 Vaccine (6 - 2023-24 season) 12/01/2023     Advance Directives   What are advance directives?  Advance directives are legal documents that state your wishes and plans for medical care. These plans are made ahead of time in case you lose your ability to make decisions for yourself. Advance directives can apply to any medical decision, such as the treatments you want, and if you want to donate organs.   What are the types of advance directives?  There are many types of advance directives, and each state has rules about how to use them. You may choose a combination of  any of the following:  Living will:  This is a written record of the treatment you want. You can also choose which treatments you do not want, which to limit, and which to stop at a certain time. This includes surgery, medicine, IV fluid, and tube feedings.   Durable power of  for healthcare (DPAHC):  This is a written record that states who you want to make healthcare choices for you when you are unable to make them for yourself. This person, called a proxy, is usually a family member or a friend. You may choose more than 1 proxy.  Do not resuscitate (DNR) order:  A DNR order is used in case your heart stops beating or you stop breathing. It is a request not to have certain forms of treatment, such as CPR. A DNR order may be included in other types of advance directives.  Medical directive:  This covers the care that you want if you are in a coma, near death, or unable to make decisions for yourself. You can list the treatments you want for each condition. Treatment may include pain medicine, surgery, blood transfusions, dialysis, IV or tube feedings, and a ventilator (breathing machine).  Values history:  This document has questions about your views, beliefs, and how you feel and think about life. This information can help others choose the care that you would choose.  Why are advance directives important?  An advance directive helps you control your care. Although spoken wishes may be used, it is better to have your wishes written down. Spoken wishes can be misunderstood, or not followed. Treatments may be given even if you do not want them. An advance directive may make it easier for your family to make difficult choices about your care.       © Copyright [employer] 2018 Information is for End User's use only and may not be sold, redistributed or otherwise used for commercial purposes. All illustrations and images included in CareNotes® are the copyrighted property of [employer] or [employer]

## 2024-08-30 ENCOUNTER — TELEPHONE (OUTPATIENT)
Age: 74
End: 2024-08-30

## 2024-09-04 ENCOUNTER — APPOINTMENT (OUTPATIENT)
Dept: LAB | Facility: CLINIC | Age: 74
End: 2024-09-04
Payer: MEDICARE

## 2024-09-04 DIAGNOSIS — Z12.5 SCREENING FOR PROSTATE CANCER: ICD-10-CM

## 2024-09-04 LAB
ALBUMIN SERPL BCG-MCNC: 4.4 G/DL (ref 3.5–5)
ALP SERPL-CCNC: 61 U/L (ref 34–104)
ALT SERPL W P-5'-P-CCNC: 14 U/L (ref 7–52)
ANION GAP SERPL CALCULATED.3IONS-SCNC: 5 MMOL/L (ref 4–13)
AST SERPL W P-5'-P-CCNC: 15 U/L (ref 13–39)
BASOPHILS # BLD AUTO: 0.02 THOUSANDS/ÂΜL (ref 0–0.1)
BASOPHILS NFR BLD AUTO: 1 % (ref 0–1)
BILIRUB SERPL-MCNC: 1.14 MG/DL (ref 0.2–1)
BUN SERPL-MCNC: 17 MG/DL (ref 5–25)
CALCIUM SERPL-MCNC: 9.4 MG/DL (ref 8.4–10.2)
CHLORIDE SERPL-SCNC: 102 MMOL/L (ref 96–108)
CO2 SERPL-SCNC: 31 MMOL/L (ref 21–32)
CREAT SERPL-MCNC: 0.93 MG/DL (ref 0.6–1.3)
EOSINOPHIL # BLD AUTO: 0.07 THOUSAND/ÂΜL (ref 0–0.61)
EOSINOPHIL NFR BLD AUTO: 2 % (ref 0–6)
ERYTHROCYTE [DISTWIDTH] IN BLOOD BY AUTOMATED COUNT: 13.2 % (ref 11.6–15.1)
GFR SERPL CREATININE-BSD FRML MDRD: 80 ML/MIN/1.73SQ M
GLUCOSE P FAST SERPL-MCNC: 95 MG/DL (ref 65–99)
HCT VFR BLD AUTO: 43.8 % (ref 36.5–49.3)
HGB BLD-MCNC: 14.9 G/DL (ref 12–17)
IMM GRANULOCYTES # BLD AUTO: 0.01 THOUSAND/UL (ref 0–0.2)
IMM GRANULOCYTES NFR BLD AUTO: 0 % (ref 0–2)
LYMPHOCYTES # BLD AUTO: 1.56 THOUSANDS/ÂΜL (ref 0.6–4.47)
LYMPHOCYTES NFR BLD AUTO: 43 % (ref 14–44)
MCH RBC QN AUTO: 30.9 PG (ref 26.8–34.3)
MCHC RBC AUTO-ENTMCNC: 34 G/DL (ref 31.4–37.4)
MCV RBC AUTO: 91 FL (ref 82–98)
MONOCYTES # BLD AUTO: 0.42 THOUSAND/ÂΜL (ref 0.17–1.22)
MONOCYTES NFR BLD AUTO: 12 % (ref 4–12)
NEUTROPHILS # BLD AUTO: 1.52 THOUSANDS/ÂΜL (ref 1.85–7.62)
NEUTS SEG NFR BLD AUTO: 42 % (ref 43–75)
NRBC BLD AUTO-RTO: 0 /100 WBCS
PLATELET # BLD AUTO: 197 THOUSANDS/UL (ref 149–390)
PMV BLD AUTO: 10.9 FL (ref 8.9–12.7)
POTASSIUM SERPL-SCNC: 4.7 MMOL/L (ref 3.5–5.3)
PROT SERPL-MCNC: 7 G/DL (ref 6.4–8.4)
PSA SERPL-MCNC: 3.21 NG/ML (ref 0–4)
RBC # BLD AUTO: 4.82 MILLION/UL (ref 3.88–5.62)
SODIUM SERPL-SCNC: 138 MMOL/L (ref 135–147)
WBC # BLD AUTO: 3.6 THOUSAND/UL (ref 4.31–10.16)

## 2024-09-04 PROCEDURE — 36415 COLL VENOUS BLD VENIPUNCTURE: CPT

## 2024-09-04 PROCEDURE — G0103 PSA SCREENING: HCPCS

## 2024-11-27 ENCOUNTER — TELEPHONE (OUTPATIENT)
Dept: FAMILY MEDICINE CLINIC | Facility: CLINIC | Age: 74
End: 2024-11-27

## 2024-11-27 NOTE — TELEPHONE ENCOUNTER
Left message for patient to call back regarding appointment on 5/1/2025. Due to a change in the providers schedule this appointment will need to be rescheduled. Dr. Madrid will only be here on Monday's and Tuesday's. Due to this change his schedule is limited and he is currently scheduling into July. Patient may schedule with one of our other providers if preferred, there is just a wait to see Dr. Madrid.

## 2025-03-15 DIAGNOSIS — I10 ESSENTIAL HYPERTENSION: ICD-10-CM

## 2025-03-17 RX ORDER — LISINOPRIL 10 MG/1
10 TABLET ORAL DAILY
Qty: 90 TABLET | Refills: 0 | Status: SHIPPED | OUTPATIENT
Start: 2025-03-17

## 2025-05-12 ENCOUNTER — OFFICE VISIT (OUTPATIENT)
Dept: FAMILY MEDICINE CLINIC | Facility: CLINIC | Age: 75
End: 2025-05-12
Payer: MEDICARE

## 2025-05-12 VITALS
HEART RATE: 55 BPM | RESPIRATION RATE: 16 BRPM | DIASTOLIC BLOOD PRESSURE: 86 MMHG | SYSTOLIC BLOOD PRESSURE: 148 MMHG | BODY MASS INDEX: 20.14 KG/M2 | TEMPERATURE: 98.1 F | WEIGHT: 148.5 LBS | OXYGEN SATURATION: 95 %

## 2025-05-12 DIAGNOSIS — E04.1 THYROID NODULE: ICD-10-CM

## 2025-05-12 DIAGNOSIS — I10 PRIMARY HYPERTENSION: ICD-10-CM

## 2025-05-12 DIAGNOSIS — R97.20 ELEVATED PSA: ICD-10-CM

## 2025-05-12 DIAGNOSIS — D72.819 LEUKOPENIA, UNSPECIFIED TYPE: ICD-10-CM

## 2025-05-12 DIAGNOSIS — L30.9 DERMATITIS: ICD-10-CM

## 2025-05-12 DIAGNOSIS — Z00.00 MEDICARE ANNUAL WELLNESS VISIT, SUBSEQUENT: Primary | ICD-10-CM

## 2025-05-12 PROCEDURE — G0439 PPPS, SUBSEQ VISIT: HCPCS | Performed by: FAMILY MEDICINE

## 2025-05-12 RX ORDER — LISINOPRIL 10 MG/1
10 TABLET ORAL DAILY
Qty: 90 TABLET | Refills: 3 | Status: SHIPPED | OUTPATIENT
Start: 2025-05-12

## 2025-05-12 NOTE — ASSESSMENT & PLAN NOTE
Orders:    lisinopril (ZESTRIL) 10 mg tablet; Take 1 tablet (10 mg total) by mouth daily    Comprehensive metabolic panel; Future    Monitor BP at home

## 2025-05-12 NOTE — ASSESSMENT & PLAN NOTE
History of mild stable leukopenia. 08/2022 MMA and SPEP normal. Agent Orange exposure.     Lab Results   Component Value Date    WBC 3.60 (L) 09/04/2024    HGB 14.9 09/04/2024    HCT 43.8 09/04/2024    MCV 91 09/04/2024     09/04/2024        WBC   Date Value Ref Range Status   09/04/2024 3.60 (L) 4.31 - 10.16 Thousand/uL Final   08/28/2023 4.06 (L) 4.31 - 10.16 Thousand/uL Final   12/07/2022 3.83 (L) 4.31 - 10.16 Thousand/uL Final   04/10/2014 4.42 4.31 - 10.16 Thousand/uL Final     Comment:     New Reference Range                   Lab Results       Component                Value               Date                       [id number]               406                 08/30/2022              Lab Results       Component                Value               Date                       FOLATE                   >20.0 (H)           08/30/2022           Orders:    CBC and differential

## 2025-05-12 NOTE — PATIENT INSTRUCTIONS
Medicare Preventive Visit Patient Instructions  Thank you for completing your Welcome to Medicare Visit or Medicare Annual Wellness Visit today. Your next wellness visit will be due in one year (5/13/2026).  The screening/preventive services that you may require over the next 5-10 years are detailed below. Some tests may not apply to you based off risk factors and/or age. Screening tests ordered at today's visit but not completed yet may show as past due. Also, please note that scanned in results may not display below.  Preventive Screenings:  Service Recommendations Previous Testing/Comments   Colorectal Cancer Screening  Colonoscopy    Fecal Occult Blood Test (FOBT)/Fecal Immunochemical Test (FIT)  Fecal DNA/Cologuard Test  Flexible Sigmoidoscopy Age: 45-75 years old   Colonoscopy: every 10 years (May be performed more frequently if at higher risk)  OR  FOBT/FIT: every 1 year  OR  Cologuard: every 3 years  OR  Sigmoidoscopy: every 5 years  Screening may be recommended earlier than age 45 if at higher risk for colorectal cancer. Also, an individualized decision between you and your healthcare provider will decide whether screening between the ages of 76-85 would be appropriate. Colonoscopy: 04/21/2022  FOBT/FIT: Not on file  Cologuard: Not on file  Sigmoidoscopy: Not on file          Prostate Cancer Screening Individualized decision between patient and health care provider in men between ages of 55-69   Medicare will cover every 12 months beginning on the day after your 50th birthday PSA: 3.207 ng/mL           Hepatitis C Screening Once for adults born between 1945 and 1965  More frequently in patients at high risk for Hepatitis C Hep C Antibody: Not on file        Diabetes Screening 1-2 times per year if you're at risk for diabetes or have pre-diabetes Fasting glucose: 95 mg/dL (9/4/2024)  A1C: No results in last 5 years (No results in last 5 years)      Cholesterol Screening Once every 5 years if you don't have a  lipid disorder. May order more often based on risk factors. Lipid panel: 08/17/2021         Other Preventive Screenings Covered by Medicare:  Abdominal Aortic Aneurysm (AAA) Screening: covered once if your at risk. You're considered to be at risk if you have a family history of AAA or a male between the age of 65-75 who smoking at least 100 cigarettes in your lifetime.  Lung Cancer Screening: covers low dose CT scan once per year if you meet all of the following conditions: (1) Age 55-77; (2) No signs or symptoms of lung cancer; (3) Current smoker or have quit smoking within the last 15 years; (4) You have a tobacco smoking history of at least 20 pack years (packs per day x number of years you smoked); (5) You get a written order from a healthcare provider.  Glaucoma Screening: covered annually if you're considered high risk: (1) You have diabetes OR (2) Family history of glaucoma OR (3)  aged 50 and older OR (4)  American aged 65 and older  Osteoporosis Screening: covered every 2 years if you meet one of the following conditions: (1) Have a vertebral abnormality; (2) On glucocorticoid therapy for more than 3 months; (3) Have primary hyperparathyroidism; (4) On osteoporosis medications and need to assess response to drug therapy.  HIV Screening: covered annually if you're between the age of 15-65. Also covered annually if you are younger than 15 and older than 65 with risk factors for HIV infection. For pregnant patients, it is covered up to 3 times per pregnancy.    Immunizations:  Immunization Recommendations   Influenza Vaccine Annual influenza vaccination during flu season is recommended for all persons aged >= 6 months who do not have contraindications   Pneumococcal Vaccine   * Pneumococcal conjugate vaccine = PCV13 (Prevnar 13), PCV15 (Vaxneuvance), PCV20 (Prevnar 20)  * Pneumococcal polysaccharide vaccine = PPSV23 (Pneumovax) Adults 19-65 yo with certain risk factors or if 65+ yo  If  never received any pneumonia vaccine: recommend Prevnar 20 (PCV20)  Give PCV20 if previously received 1 dose of PCV13 or PPSV23   Hepatitis B Vaccine 3 dose series if at intermediate or high risk (ex: diabetes, end stage renal disease, liver disease)   Respiratory syncytial virus (RSV) Vaccine - COVERED BY MEDICARE PART D  * RSVPreF3 (Arexvy) CDC recommends that adults 60 years of age and older may receive a single dose of RSV vaccine using shared clinical decision-making (SCDM)   Tetanus (Td) Vaccine - COST NOT COVERED BY MEDICARE PART B Following completion of primary series, a booster dose should be given every 10 years to maintain immunity against tetanus. Td may also be given as tetanus wound prophylaxis.   Tdap Vaccine - COST NOT COVERED BY MEDICARE PART B Recommended at least once for all adults. For pregnant patients, recommended with each pregnancy.   Shingles Vaccine (Shingrix) - COST NOT COVERED BY MEDICARE PART B  2 shot series recommended in those 19 years and older who have or will have weakened immune systems or those 50 years and older     Health Maintenance Due:      Topic Date Due   • Colorectal Cancer Screening  04/21/2027   • Hepatitis C Screening  Addressed     Immunizations Due:      Topic Date Due   • Pneumococcal Vaccine: 65+ Years (1 of 1 - PCV) Never done   • COVID-19 Vaccine (7 - 2024-25 season) 05/01/2025     Advance Directives   What are advance directives?  Advance directives are legal documents that state your wishes and plans for medical care. These plans are made ahead of time in case you lose your ability to make decisions for yourself. Advance directives can apply to any medical decision, such as the treatments you want, and if you want to donate organs.   What are the types of advance directives?  There are many types of advance directives, and each state has rules about how to use them. You may choose a combination of any of the following:  Living will:  This is a written record  of the treatment you want. You can also choose which treatments you do not want, which to limit, and which to stop at a certain time. This includes surgery, medicine, IV fluid, and tube feedings.   Durable power of  for healthcare (DPAHC):  This is a written record that states who you want to make healthcare choices for you when you are unable to make them for yourself. This person, called a proxy, is usually a family member or a friend. You may choose more than 1 proxy.  Do not resuscitate (DNR) order:  A DNR order is used in case your heart stops beating or you stop breathing. It is a request not to have certain forms of treatment, such as CPR. A DNR order may be included in other types of advance directives.  Medical directive:  This covers the care that you want if you are in a coma, near death, or unable to make decisions for yourself. You can list the treatments you want for each condition. Treatment may include pain medicine, surgery, blood transfusions, dialysis, IV or tube feedings, and a ventilator (breathing machine).  Values history:  This document has questions about your views, beliefs, and how you feel and think about life. This information can help others choose the care that you would choose.  Why are advance directives important?  An advance directive helps you control your care. Although spoken wishes may be used, it is better to have your wishes written down. Spoken wishes can be misunderstood, or not followed. Treatments may be given even if you do not want them. An advance directive may make it easier for your family to make difficult choices about your care.       © Copyright [employer] 2018 Information is for End User's use only and may not be sold, redistributed or otherwise used for commercial purposes. All illustrations and images included in CareNotes® are the copyrighted property of [employer] or [employer]

## 2025-05-12 NOTE — PROGRESS NOTES
Name: Benji Anderson      : 1950      MRN: [id number]  Encounter Provider: Melo Madrid MD  Encounter Date: 2025   Encounter department: [facility]  :  Assessment & Plan  Medicare annual wellness visit, subsequent         Primary hypertension    Orders:    lisinopril (ZESTRIL) 10 mg tablet; Take 1 tablet (10 mg total) by mouth daily    Comprehensive metabolic panel; Future    Monitor BP at home   Leukopenia, unspecified type    History of mild stable leukopenia. 2022 MMA and SPEP normal. Agent Orange exposure.     Lab Results   Component Value Date    WBC 3.60 (L) 2024    HGB 14.9 2024    HCT 43.8 2024    MCV 91 2024     2024        WBC   Date Value Ref Range Status   2024 3.60 (L) 4.31 - 10.16 Thousand/uL Final   2023 4.06 (L) 4.31 - 10.16 Thousand/uL Final   2022 3.83 (L) 4.31 - 10.16 Thousand/uL Final   04/10/2014 4.42 4.31 - 10.16 Thousand/uL Final     Comment:     New Reference Range                   Lab Results       Component                Value               Date                       [id number]               406                 2022              Lab Results       Component                Value               Date                       FOLATE                   >20.0 (H)           2022           Orders:    CBC and differential    Thyroid nodule    History of thyroid nodules. 2023  2 thyroid nodules. No nodule meets current ACR criteria for requiring biopsy or followup ultrasounds. . History of radiation for acne as a teenager.        Elevated PSA    Lab Results   Component Value Date    PSA 3.207 2024    PSA 2.67 2023    PSA 2.5 2022           Orders:    PSA Total, Diagnostic; Future    Dermatitis    Non healing lesion LLE  concerned about possible Lyme disease    He has a follow up OV with Dermatology this month     Orders:    Lyme Total AB W Reflex to IGM/IGG;  Future      Depression Screening and Follow-up Plan: Patient was screened for depression during today's encounter. They screened negative with a PHQ-2 score of 0.        Preventive health issues were discussed with patient, and age appropriate screening tests were ordered as noted in patient's After Visit Summary. Personalized health advice and appropriate referrals for health education or preventive services given if needed, as noted in patient's After Visit Summary.    History of Present Illness       CC AWV       Patient Care Team:  Melo Madrid MD as PCP - General  Joshua Mendosa MD (Dermatology)    Review of Systems   Constitutional:  Negative for appetite change and unexpected weight change.   HENT:  Negative for trouble swallowing and voice change.    Respiratory:  Negative for cough, shortness of breath and wheezing.    Cardiovascular:  Negative for chest pain, palpitations and leg swelling.        11/2021 abd aorta u/s no AAA       Gastrointestinal:  Negative for abdominal pain, blood in stool, constipation, diarrhea, nausea and vomiting.        04/2022  colonoscopy.    Endocrine:                      Genitourinary:  Negative for difficulty urinating.                        Musculoskeletal:  Positive for arthralgias. Negative for myalgias.        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 10/2022 Hand x rays mild, moderate and moderate severe osteoarthritis both hands. Periarticular soft tissue swelling adjacent to multiple involved joints. 7/2023 Rheumatology evaluation patient was prescribed Sulfasalazine which he opted not to start due to concerns about side effects.   Skin:  Negative for rash.   Neurological:  Negative for dizziness and headaches.   Hematological:  Negative for adenopathy. Does not bruise/bleed easily.             Psychiatric/Behavioral:  Negative for dysphoric mood and sleep disturbance.      Medical History Reviewed by provider this encounter:  Tobacco  Allergies  Meds  Problems   Med Hx  Surg Hx  Fam Hx       Annual Wellness Visit Questionnaire   Benji is here for his Subsequent Wellness visit.     Health Risk Assessment:   Patient rates overall health as very good. Patient feels that their physical health rating is same. Patient is very satisfied with their life. Eyesight was rated as same. Hearing was rated as same. Patient feels that their emotional and mental health rating is same. Patients states they are never, rarely angry. Patient states they are sometimes unusually tired/fatigued. Pain experienced in the last 7 days has been none. Patient states that he has experienced no weight loss or gain in last 6 months.     Depression Screening:   PHQ-2 Score: 0      Fall Risk Screening:   In the past year, patient has experienced: no history of falling in past year      Home Safety:  Patient does not have trouble with stairs inside or outside of their home. Patient has working smoke alarms and has no working carbon monoxide detector. Home safety hazards include: none.     Nutrition:   Current diet is Regular.     Medications:   Patient is not currently taking any over-the-counter supplements. Patient is able to manage medications.     Activities of Daily Living (ADLs)/Instrumental Activities of Daily Living (IADLs):   Walk and transfer into and out of bed and chair?: Yes  Dress and groom yourself?: Yes    Bathe or shower yourself?: Yes    Feed yourself? Yes  Do your laundry/housekeeping?: Yes  Manage your money, pay your bills and track your expenses?: Yes  Make your own meals?: Yes    Do your own shopping?: Yes    Previous Hospitalizations:   Any hospitalizations or ED visits within the last 12 months?: No      Advance Care Planning:   Living will: No    Durable POA for healthcare: No    Advanced directive: No      Cognitive Screening:   Provider or family/friend/caregiver concerned regarding cognition?: No    Preventive Screenings      Cardiovascular Screening:    General: Screening  Current      Diabetes Screening:     General: Screening Current      Colorectal Cancer Screening:     General: Screening Current      Prostate Cancer Screening:    General: Screening Current      Osteoporosis Screening:    General: Screening Not Indicated      Abdominal Aortic Aneurysm (AAA) Screening:        General: Screening Current      Lung Cancer Screening:     General: Screening Not Indicated      Hepatitis C Screening:    General: Screening Current    Immunizations:  - Immunizations due: Prevnar 20 and Zoster (Shingrix)    Screening, Brief Intervention, and Referral to Treatment (SBIRT)     Screening  Typical number of drinks in a day: 0  Typical number of drinks in a week: 0  Interpretation: Low risk drinking behavior.    AUDIT-C Screenin) How often did you have a drink containing alcohol in the past year? monthly or less  2) How many drinks did you have on a typical day when you were drinking in the past year? 1 to 2  3) How often did you have 6 or more drinks on one occasion in the past year? never    AUDIT-C Score: 1  Interpretation: Score 0-3 (male): Negative screen for alcohol misuse    Single Item Drug Screening:  How often have you used an illegal drug (including marijuana) or a prescription medication for non-medical reasons in the past year? never    Single Item Drug Screen Score: 0  Interpretation: Negative screen for possible drug use disorder    Brief Intervention  Alcohol & drug use screenings were reviewed. No concerns regarding substance use disorder identified.     Other Counseling Topics:   Regular weightbearing exercise and calcium and vitamin D intake.     Social Drivers of Health     Financial Resource Strain: Low Risk  (2023)    Overall Financial Resource Strain (CARDIA)     Difficulty of Paying Living Expenses: Not hard at all   Food Insecurity: No Food Insecurity (2025)    Hunger Vital Sign     Worried About Running Out of Food in the Last Year: Never true     Ran Out  of Food in the Last Year: Never true   Transportation Needs: No Transportation Needs (5/12/2025)    PRAPARE - Transportation     Lack of Transportation (Medical): No     Lack of Transportation (Non-Medical): No   Housing Stability: Low Risk  (5/12/2025)    Housing Stability Vital Sign     Unable to Pay for Housing in the Last Year: No     Number of Times Moved in the Last Year: 0     Homeless in the Last Year: No   Utilities: Not At Risk (5/12/2025)    [facility] Utilities     Threatened with loss of utilities: No     No results found.    Objective   /86 (BP Location: Left arm, Patient Position: Sitting, Cuff Size: Standard)   Pulse 55   Temp 98.1 °F (36.7 °C) (Temporal)   Resp 16   Wt 67.4 kg (148 lb 8 oz)   SpO2 95%   BMI 20.14 kg/m²       Wt Readings from Last 3 Encounters:   05/12/25 67.4 kg (148 lb 8 oz)   04/17/24 68.5 kg (151 lb)   09/18/23 66 kg (145 lb 8 oz)        Physical Exam  Constitutional:       General: He is not in acute distress.  HENT:      Right Ear: Tympanic membrane and ear canal normal.      Left Ear: Tympanic membrane and ear canal normal.   Eyes:      General: No scleral icterus.     Extraocular Movements: Extraocular movements intact.      Conjunctiva/sclera: Conjunctivae normal.      Pupils: Pupils are equal, round, and reactive to light.   Neck:      Thyroid: No thyroid mass or thyromegaly.      Vascular: Normal carotid pulses. No carotid bruit.   Cardiovascular:      Rate and Rhythm: Normal rate and regular rhythm.      Heart sounds: No murmur heard.     No gallop.   Pulmonary:      Effort: Pulmonary effort is normal.      Breath sounds: Normal breath sounds. No wheezing or rales.   Musculoskeletal:      Right lower leg: No edema.      Left lower leg: No edema.   Lymphadenopathy:      Cervical: No cervical adenopathy.   Skin:     Findings: Lesion present. No rash.      Comments: Slightly raised nodular lesion LLE at site prior injury    Neurological:      General: No focal deficit  present.      Mental Status: He is alert and oriented to person, place, and time.   Psychiatric:         Mood and Affect: Mood normal.

## 2025-05-12 NOTE — ASSESSMENT & PLAN NOTE
History of thyroid nodules. 11/2023  2 thyroid nodules. No nodule meets current ACR criteria for requiring biopsy or followup ultrasounds. . History of radiation for acne as a teenager.